# Patient Record
Sex: MALE | Race: WHITE | HISPANIC OR LATINO | URBAN - METROPOLITAN AREA
[De-identification: names, ages, dates, MRNs, and addresses within clinical notes are randomized per-mention and may not be internally consistent; named-entity substitution may affect disease eponyms.]

---

## 2020-01-01 ENCOUNTER — INPATIENT (INPATIENT)
Facility: HOSPITAL | Age: 60
LOS: 0 days | DRG: 208 | End: 2020-04-01
Attending: INTERNAL MEDICINE | Admitting: INTERNAL MEDICINE
Payer: COMMERCIAL

## 2020-01-01 VITALS
DIASTOLIC BLOOD PRESSURE: 87 MMHG | RESPIRATION RATE: 20 BRPM | HEART RATE: 91 BPM | WEIGHT: 184.97 LBS | TEMPERATURE: 100 F | OXYGEN SATURATION: 95 % | SYSTOLIC BLOOD PRESSURE: 140 MMHG | HEIGHT: 65 IN

## 2020-01-01 VITALS — OXYGEN SATURATION: 94 % | HEART RATE: 134 BPM

## 2020-01-01 DIAGNOSIS — R06.02 SHORTNESS OF BREATH: ICD-10-CM

## 2020-01-01 LAB
4/8 RATIO: 4.03 RATIO — HIGH (ref 0.9–3.6)
ABS CD8: 59 /UL — LOW (ref 142–740)
ALBUMIN SERPL ELPH-MCNC: 1.8 G/DL — LOW (ref 3.5–5)
ALBUMIN SERPL ELPH-MCNC: 1.8 G/DL — LOW (ref 3.5–5)
ALBUMIN SERPL ELPH-MCNC: 2.4 G/DL — LOW (ref 3.5–5)
ALP SERPL-CCNC: 179 U/L — HIGH (ref 40–120)
ALP SERPL-CCNC: 182 U/L — HIGH (ref 40–120)
ALP SERPL-CCNC: 185 U/L — HIGH (ref 40–120)
ALT FLD-CCNC: 61 U/L DA — HIGH (ref 10–60)
ALT FLD-CCNC: 86 U/L DA — HIGH (ref 10–60)
ALT FLD-CCNC: 95 U/L DA — HIGH (ref 10–60)
ANION GAP SERPL CALC-SCNC: 12 MMOL/L — SIGNIFICANT CHANGE UP (ref 5–17)
ANION GAP SERPL CALC-SCNC: 7 MMOL/L — SIGNIFICANT CHANGE UP (ref 5–17)
ANION GAP SERPL CALC-SCNC: 9 MMOL/L — SIGNIFICANT CHANGE UP (ref 5–17)
AST SERPL-CCNC: 181 U/L — HIGH (ref 10–40)
AST SERPL-CCNC: 203 U/L — HIGH (ref 10–40)
AST SERPL-CCNC: 62 U/L — HIGH (ref 10–40)
BASE EXCESS BLDA CALC-SCNC: -10.7 MMOL/L — LOW (ref -2–2)
BASE EXCESS BLDA CALC-SCNC: -11.9 MMOL/L — LOW (ref -2–2)
BASE EXCESS BLDA CALC-SCNC: -6.2 MMOL/L — LOW (ref -2–2)
BASE EXCESS BLDA CALC-SCNC: SIGNIFICANT CHANGE UP MMOL/L (ref -2–2)
BASOPHILS # BLD AUTO: 0.01 K/UL — SIGNIFICANT CHANGE UP (ref 0–0.2)
BASOPHILS NFR BLD AUTO: 0.1 % — SIGNIFICANT CHANGE UP (ref 0–2)
BILIRUB SERPL-MCNC: 0.6 MG/DL — SIGNIFICANT CHANGE UP (ref 0.2–1.2)
BILIRUB SERPL-MCNC: 1.3 MG/DL — HIGH (ref 0.2–1.2)
BILIRUB SERPL-MCNC: 1.4 MG/DL — HIGH (ref 0.2–1.2)
BLOOD GAS COMMENTS ARTERIAL: SIGNIFICANT CHANGE UP
BUN SERPL-MCNC: 10 MG/DL — SIGNIFICANT CHANGE UP (ref 7–18)
BUN SERPL-MCNC: 21 MG/DL — HIGH (ref 7–18)
BUN SERPL-MCNC: 24 MG/DL — HIGH (ref 7–18)
CALCIUM SERPL-MCNC: 6.9 MG/DL — LOW (ref 8.4–10.5)
CALCIUM SERPL-MCNC: 7 MG/DL — LOW (ref 8.4–10.5)
CALCIUM SERPL-MCNC: 7.9 MG/DL — LOW (ref 8.4–10.5)
CD3 BLASTS SPEC-ACNC: 330 /UL — LOW (ref 672–1870)
CD3 BLASTS SPEC-ACNC: 65 % — SIGNIFICANT CHANGE UP (ref 59–83)
CD4 %: 46 % — SIGNIFICANT CHANGE UP (ref 30–62)
CD8 %: 12 % — SIGNIFICANT CHANGE UP (ref 12–36)
CHLORIDE SERPL-SCNC: 100 MMOL/L — SIGNIFICANT CHANGE UP (ref 96–108)
CHLORIDE SERPL-SCNC: 102 MMOL/L — SIGNIFICANT CHANGE UP (ref 96–108)
CHLORIDE SERPL-SCNC: 103 MMOL/L — SIGNIFICANT CHANGE UP (ref 96–108)
CK MB BLD-MCNC: 0.9 % — SIGNIFICANT CHANGE UP (ref 0–3.5)
CK MB CFR SERPL CALC: 25.8 NG/ML — HIGH (ref 0–3.6)
CK SERPL-CCNC: 2859 U/L — HIGH (ref 35–232)
CK SERPL-CCNC: 809 U/L — HIGH (ref 35–232)
CO2 SERPL-SCNC: 26 MMOL/L — SIGNIFICANT CHANGE UP (ref 22–31)
CO2 SERPL-SCNC: 27 MMOL/L — SIGNIFICANT CHANGE UP (ref 22–31)
CO2 SERPL-SCNC: 31 MMOL/L — SIGNIFICANT CHANGE UP (ref 22–31)
CREAT SERPL-MCNC: 1.04 MG/DL — SIGNIFICANT CHANGE UP (ref 0.5–1.3)
CREAT SERPL-MCNC: 4.54 MG/DL — HIGH (ref 0.5–1.3)
CREAT SERPL-MCNC: 5.42 MG/DL — HIGH (ref 0.5–1.3)
CRP SERPL-MCNC: 33.63 MG/DL — HIGH (ref 0–0.4)
D DIMER BLD IA.RAPID-MCNC: 6975 NG/ML DDU — HIGH
EOSINOPHIL # BLD AUTO: 0.01 K/UL — SIGNIFICANT CHANGE UP (ref 0–0.5)
EOSINOPHIL NFR BLD AUTO: 0.1 % — SIGNIFICANT CHANGE UP (ref 0–6)
FERRITIN SERPL-MCNC: 5311 NG/ML — HIGH (ref 30–400)
FLU A RESULT: SIGNIFICANT CHANGE UP
FLU A RESULT: SIGNIFICANT CHANGE UP
FLUAV AG NPH QL: SIGNIFICANT CHANGE UP
FLUBV AG NPH QL: SIGNIFICANT CHANGE UP
GLUCOSE BLDC GLUCOMTR-MCNC: 136 MG/DL — HIGH (ref 70–99)
GLUCOSE BLDC GLUCOMTR-MCNC: 149 MG/DL — HIGH (ref 70–99)
GLUCOSE BLDC GLUCOMTR-MCNC: 179 MG/DL — HIGH (ref 70–99)
GLUCOSE BLDC GLUCOMTR-MCNC: 179 MG/DL — HIGH (ref 70–99)
GLUCOSE BLDC GLUCOMTR-MCNC: 94 MG/DL — SIGNIFICANT CHANGE UP (ref 70–99)
GLUCOSE BLDC GLUCOMTR-MCNC: 98 MG/DL — SIGNIFICANT CHANGE UP (ref 70–99)
GLUCOSE SERPL-MCNC: 129 MG/DL — HIGH (ref 70–99)
GLUCOSE SERPL-MCNC: 158 MG/DL — HIGH (ref 70–99)
GLUCOSE SERPL-MCNC: 166 MG/DL — HIGH (ref 70–99)
HCO3 BLDA-SCNC: 23 MMOL/L — SIGNIFICANT CHANGE UP (ref 23–27)
HCO3 BLDA-SCNC: 27 MMOL/L — SIGNIFICANT CHANGE UP (ref 23–27)
HCO3 BLDA-SCNC: 27 MMOL/L — SIGNIFICANT CHANGE UP (ref 23–27)
HCO3 BLDA-SCNC: 28 MMOL/L — HIGH (ref 23–27)
HCT VFR BLD CALC: 35.7 % — LOW (ref 39–50)
HCT VFR BLD CALC: 42.1 % — SIGNIFICANT CHANGE UP (ref 39–50)
HGB BLD-MCNC: 12.3 G/DL — LOW (ref 13–17)
HGB BLD-MCNC: 13 G/DL — SIGNIFICANT CHANGE UP (ref 13–17)
HOROWITZ INDEX BLDA+IHG-RTO: 100 — SIGNIFICANT CHANGE UP
HOROWITZ INDEX BLDA+IHG-RTO: 100 — SIGNIFICANT CHANGE UP
HOROWITZ INDEX BLDA+IHG-RTO: 90 — SIGNIFICANT CHANGE UP
HOROWITZ INDEX BLDA+IHG-RTO: 90 — SIGNIFICANT CHANGE UP
IMM GRANULOCYTES NFR BLD AUTO: 0.9 % — SIGNIFICANT CHANGE UP (ref 0–1.5)
INR BLD: 1.04 RATIO — SIGNIFICANT CHANGE UP (ref 0.88–1.16)
LACTATE SERPL-SCNC: 3.1 MMOL/L — HIGH (ref 0.7–2)
LDH SERPL L TO P-CCNC: 911 U/L — HIGH (ref 120–225)
LYMPHOCYTES # BLD AUTO: 0.52 K/UL — LOW (ref 1–3.3)
LYMPHOCYTES # BLD AUTO: 5.3 % — LOW (ref 13–44)
MAGNESIUM SERPL-MCNC: 2.3 MG/DL — SIGNIFICANT CHANGE UP (ref 1.6–2.6)
MAGNESIUM SERPL-MCNC: 2.3 MG/DL — SIGNIFICANT CHANGE UP (ref 1.6–2.6)
MCHC RBC-ENTMCNC: 29.5 PG — SIGNIFICANT CHANGE UP (ref 27–34)
MCHC RBC-ENTMCNC: 29.6 PG — SIGNIFICANT CHANGE UP (ref 27–34)
MCHC RBC-ENTMCNC: 30.9 GM/DL — LOW (ref 32–36)
MCHC RBC-ENTMCNC: 34.5 GM/DL — SIGNIFICANT CHANGE UP (ref 32–36)
MCV RBC AUTO: 86 FL — SIGNIFICANT CHANGE UP (ref 80–100)
MCV RBC AUTO: 95.7 FL — SIGNIFICANT CHANGE UP (ref 80–100)
MONOCYTES # BLD AUTO: 0.35 K/UL — SIGNIFICANT CHANGE UP (ref 0–0.9)
MONOCYTES NFR BLD AUTO: 3.6 % — SIGNIFICANT CHANGE UP (ref 2–14)
NEUTROPHILS # BLD AUTO: 8.77 K/UL — HIGH (ref 1.8–7.4)
NEUTROPHILS NFR BLD AUTO: 90 % — HIGH (ref 43–77)
NRBC # BLD: 0 /100 WBCS — SIGNIFICANT CHANGE UP (ref 0–0)
NRBC # BLD: 0 /100 WBCS — SIGNIFICANT CHANGE UP (ref 0–0)
PCO2 BLDA: 116 MMHG — CRITICAL HIGH (ref 32–46)
PCO2 BLDA: 138 MMHG — CRITICAL HIGH (ref 32–46)
PCO2 BLDA: 173 MMHG — CRITICAL HIGH (ref 32–46)
PCO2 BLDA: 95 MMHG — CRITICAL HIGH (ref 32–46)
PH BLDA: 6.82 — CRITICAL LOW (ref 7.35–7.45)
PH BLDA: 6.92 — CRITICAL LOW (ref 7.35–7.45)
PH BLDA: 7.01 — CRITICAL LOW (ref 7.35–7.45)
PH BLDA: 7.02 — CRITICAL LOW (ref 7.35–7.45)
PHOSPHATE SERPL-MCNC: 8.1 MG/DL — HIGH (ref 2.5–4.5)
PHOSPHATE SERPL-MCNC: 9.3 MG/DL — HIGH (ref 2.5–4.5)
PLATELET # BLD AUTO: 212 K/UL — SIGNIFICANT CHANGE UP (ref 150–400)
PLATELET # BLD AUTO: 263 K/UL — SIGNIFICANT CHANGE UP (ref 150–400)
PO2 BLDA: 117 MMHG — HIGH (ref 74–108)
PO2 BLDA: 71 MMHG — LOW (ref 74–108)
PO2 BLDA: 77 MMHG — SIGNIFICANT CHANGE UP (ref 74–108)
PO2 BLDA: 90 MMHG — SIGNIFICANT CHANGE UP (ref 74–108)
POTASSIUM SERPL-MCNC: 3.3 MMOL/L — LOW (ref 3.5–5.3)
POTASSIUM SERPL-MCNC: 4.2 MMOL/L — SIGNIFICANT CHANGE UP (ref 3.5–5.3)
POTASSIUM SERPL-MCNC: 4.7 MMOL/L — SIGNIFICANT CHANGE UP (ref 3.5–5.3)
POTASSIUM SERPL-SCNC: 3.3 MMOL/L — LOW (ref 3.5–5.3)
POTASSIUM SERPL-SCNC: 4.2 MMOL/L — SIGNIFICANT CHANGE UP (ref 3.5–5.3)
POTASSIUM SERPL-SCNC: 4.7 MMOL/L — SIGNIFICANT CHANGE UP (ref 3.5–5.3)
PROCALCITONIN SERPL-MCNC: 196.8 NG/ML — HIGH (ref 0.02–0.1)
PROT SERPL-MCNC: 6 G/DL — SIGNIFICANT CHANGE UP (ref 6–8.3)
PROT SERPL-MCNC: 6.3 G/DL — SIGNIFICANT CHANGE UP (ref 6–8.3)
PROT SERPL-MCNC: 7.3 G/DL — SIGNIFICANT CHANGE UP (ref 6–8.3)
PROTHROM AB SERPL-ACNC: 11.8 SEC — SIGNIFICANT CHANGE UP (ref 10–12.9)
RAPID RVP RESULT: SIGNIFICANT CHANGE UP
RBC # BLD: 4.15 M/UL — LOW (ref 4.2–5.8)
RBC # BLD: 4.4 M/UL — SIGNIFICANT CHANGE UP (ref 4.2–5.8)
RBC # FLD: 13.2 % — SIGNIFICANT CHANGE UP (ref 10.3–14.5)
RBC # FLD: 14 % — SIGNIFICANT CHANGE UP (ref 10.3–14.5)
RSV RESULT: SIGNIFICANT CHANGE UP
RSV RNA RESP QL NAA+PROBE: SIGNIFICANT CHANGE UP
SAO2 % BLDA: 84 % — LOW (ref 92–96)
SAO2 % BLDA: 92 % — SIGNIFICANT CHANGE UP (ref 92–96)
SAO2 % BLDA: 95 % — SIGNIFICANT CHANGE UP (ref 92–96)
SAO2 % BLDA: SIGNIFICANT CHANGE UP % (ref 92–96)
SARS-COV-2 RNA SPEC QL NAA+PROBE: DETECTED
SODIUM SERPL-SCNC: 137 MMOL/L — SIGNIFICANT CHANGE UP (ref 135–145)
SODIUM SERPL-SCNC: 140 MMOL/L — SIGNIFICANT CHANGE UP (ref 135–145)
SODIUM SERPL-SCNC: 140 MMOL/L — SIGNIFICANT CHANGE UP (ref 135–145)
T-CELL CD4 SUBSET PNL BLD: 237 /UL — LOW (ref 489–1457)
TROPONIN I SERPL-MCNC: 4.84 NG/ML — HIGH (ref 0–0.04)
TROPONIN I SERPL-MCNC: 5.93 NG/ML — HIGH (ref 0–0.04)
WBC # BLD: 34.96 K/UL — HIGH (ref 3.8–10.5)
WBC # BLD: 9.75 K/UL — SIGNIFICANT CHANGE UP (ref 3.8–10.5)
WBC # FLD AUTO: 34.96 K/UL — HIGH (ref 3.8–10.5)
WBC # FLD AUTO: 9.75 K/UL — SIGNIFICANT CHANGE UP (ref 3.8–10.5)

## 2020-01-01 PROCEDURE — 31500 INSERT EMERGENCY AIRWAY: CPT

## 2020-01-01 PROCEDURE — 99292 CRITICAL CARE ADDL 30 MIN: CPT

## 2020-01-01 PROCEDURE — 71045 X-RAY EXAM CHEST 1 VIEW: CPT | Mod: 26

## 2020-01-01 PROCEDURE — 71045 X-RAY EXAM CHEST 1 VIEW: CPT | Mod: 26,77

## 2020-01-01 PROCEDURE — 99291 CRITICAL CARE FIRST HOUR: CPT

## 2020-01-01 PROCEDURE — 99285 EMERGENCY DEPT VISIT HI MDM: CPT | Mod: 25

## 2020-01-01 RX ORDER — HYDROMORPHONE HYDROCHLORIDE 2 MG/ML
1 INJECTION INTRAMUSCULAR; INTRAVENOUS; SUBCUTANEOUS
Qty: 100 | Refills: 0 | Status: DISCONTINUED | OUTPATIENT
Start: 2020-01-01 | End: 2020-01-01

## 2020-01-01 RX ORDER — PROPOFOL 10 MG/ML
5 INJECTION, EMULSION INTRAVENOUS
Qty: 1000 | Refills: 0 | Status: DISCONTINUED | OUTPATIENT
Start: 2020-01-01 | End: 2020-01-01

## 2020-01-01 RX ORDER — ETOMIDATE 2 MG/ML
20 INJECTION INTRAVENOUS ONCE
Refills: 0 | Status: DISCONTINUED | OUTPATIENT
Start: 2020-01-01 | End: 2020-01-01

## 2020-01-01 RX ORDER — HYDROXYCHLOROQUINE SULFATE 200 MG
TABLET ORAL
Refills: 0 | Status: DISCONTINUED | OUTPATIENT
Start: 2020-01-01 | End: 2020-01-01

## 2020-01-01 RX ORDER — CEFEPIME 1 G/1
500 INJECTION, POWDER, FOR SOLUTION INTRAMUSCULAR; INTRAVENOUS DAILY
Refills: 0 | Status: DISCONTINUED | OUTPATIENT
Start: 2020-01-01 | End: 2020-01-01

## 2020-01-01 RX ORDER — CHLORHEXIDINE GLUCONATE 213 G/1000ML
15 SOLUTION TOPICAL EVERY 12 HOURS
Refills: 0 | Status: DISCONTINUED | OUTPATIENT
Start: 2020-01-01 | End: 2020-01-01

## 2020-01-01 RX ORDER — CISATRACURIUM BESYLATE 2 MG/ML
10 INJECTION INTRAVENOUS ONCE
Refills: 0 | Status: DISCONTINUED | OUTPATIENT
Start: 2020-01-01 | End: 2020-01-01

## 2020-01-01 RX ORDER — FUROSEMIDE 40 MG
100 TABLET ORAL ONCE
Refills: 0 | Status: COMPLETED | OUTPATIENT
Start: 2020-01-01 | End: 2020-01-01

## 2020-01-01 RX ORDER — FENTANYL CITRATE 50 UG/ML
0.5 INJECTION INTRAVENOUS
Qty: 2500 | Refills: 0 | Status: DISCONTINUED | OUTPATIENT
Start: 2020-01-01 | End: 2020-01-01

## 2020-01-01 RX ORDER — HYDROXYCHLOROQUINE SULFATE 200 MG
200 TABLET ORAL EVERY 12 HOURS
Refills: 0 | Status: DISCONTINUED | OUTPATIENT
Start: 2020-01-01 | End: 2020-01-01

## 2020-01-01 RX ORDER — CEFEPIME 1 G/1
1000 INJECTION, POWDER, FOR SOLUTION INTRAMUSCULAR; INTRAVENOUS ONCE
Refills: 0 | Status: COMPLETED | OUTPATIENT
Start: 2020-01-01 | End: 2020-01-01

## 2020-01-01 RX ORDER — THIAMINE MONONITRATE (VIT B1) 100 MG
200 TABLET ORAL EVERY 24 HOURS
Refills: 0 | Status: DISCONTINUED | OUTPATIENT
Start: 2020-01-01 | End: 2020-01-01

## 2020-01-01 RX ORDER — HYDROMORPHONE HYDROCHLORIDE 2 MG/ML
1 INJECTION INTRAMUSCULAR; INTRAVENOUS; SUBCUTANEOUS ONCE
Refills: 0 | Status: DISCONTINUED | OUTPATIENT
Start: 2020-01-01 | End: 2020-01-01

## 2020-01-01 RX ORDER — ENOXAPARIN SODIUM 100 MG/ML
40 INJECTION SUBCUTANEOUS DAILY
Refills: 0 | Status: DISCONTINUED | OUTPATIENT
Start: 2020-01-01 | End: 2020-01-01

## 2020-01-01 RX ORDER — POTASSIUM CHLORIDE 20 MEQ
10 PACKET (EA) ORAL ONCE
Refills: 0 | Status: DISCONTINUED | OUTPATIENT
Start: 2020-01-01 | End: 2020-01-01

## 2020-01-01 RX ORDER — HEPARIN SODIUM 5000 [USP'U]/ML
5000 INJECTION INTRAVENOUS; SUBCUTANEOUS EVERY 8 HOURS
Refills: 0 | Status: DISCONTINUED | OUTPATIENT
Start: 2020-01-01 | End: 2020-01-01

## 2020-01-01 RX ORDER — SUCCINYLCHOLINE CHLORIDE 100 MG/5ML
100 SYRINGE (ML) INTRAVENOUS ONCE
Refills: 0 | Status: DISCONTINUED | OUTPATIENT
Start: 2020-01-01 | End: 2020-01-01

## 2020-01-01 RX ORDER — NOREPINEPHRINE BITARTRATE/D5W 8 MG/250ML
0.05 PLASTIC BAG, INJECTION (ML) INTRAVENOUS
Qty: 16 | Refills: 0 | Status: DISCONTINUED | OUTPATIENT
Start: 2020-01-01 | End: 2020-01-01

## 2020-01-01 RX ORDER — MIDAZOLAM HYDROCHLORIDE 1 MG/ML
4 INJECTION, SOLUTION INTRAMUSCULAR; INTRAVENOUS ONCE
Refills: 0 | Status: COMPLETED | OUTPATIENT
Start: 2020-01-01 | End: 2020-01-01

## 2020-01-01 RX ORDER — VANCOMYCIN HCL 1 G
1250 VIAL (EA) INTRAVENOUS ONCE
Refills: 0 | Status: DISCONTINUED | OUTPATIENT
Start: 2020-01-01 | End: 2020-01-01

## 2020-01-01 RX ORDER — PHENYLEPHRINE HYDROCHLORIDE 10 MG/ML
0.5 INJECTION INTRAVENOUS
Qty: 40 | Refills: 0 | Status: DISCONTINUED | OUTPATIENT
Start: 2020-01-01 | End: 2020-01-01

## 2020-01-01 RX ORDER — MIDAZOLAM HYDROCHLORIDE 1 MG/ML
0.02 INJECTION, SOLUTION INTRAMUSCULAR; INTRAVENOUS
Qty: 100 | Refills: 0 | Status: DISCONTINUED | OUTPATIENT
Start: 2020-01-01 | End: 2020-01-01

## 2020-01-01 RX ORDER — ACETAMINOPHEN 500 MG
650 TABLET ORAL EVERY 6 HOURS
Refills: 0 | Status: DISCONTINUED | OUTPATIENT
Start: 2020-01-01 | End: 2020-01-01

## 2020-01-01 RX ORDER — CISATRACURIUM BESYLATE 2 MG/ML
3 INJECTION INTRAVENOUS
Qty: 200 | Refills: 0 | Status: DISCONTINUED | OUTPATIENT
Start: 2020-01-01 | End: 2020-01-01

## 2020-01-01 RX ORDER — SODIUM BICARBONATE 1 MEQ/ML
100 SYRINGE (ML) INTRAVENOUS ONCE
Refills: 0 | Status: COMPLETED | OUTPATIENT
Start: 2020-01-01 | End: 2020-01-01

## 2020-01-01 RX ORDER — POTASSIUM CHLORIDE 20 MEQ
10 PACKET (EA) ORAL ONCE
Refills: 0 | Status: COMPLETED | OUTPATIENT
Start: 2020-01-01 | End: 2020-01-01

## 2020-01-01 RX ORDER — SODIUM CHLORIDE 9 MG/ML
1000 INJECTION, SOLUTION INTRAVENOUS
Refills: 0 | Status: DISCONTINUED | OUTPATIENT
Start: 2020-01-01 | End: 2020-01-01

## 2020-01-01 RX ORDER — ASCORBIC ACID 60 MG
1500 TABLET,CHEWABLE ORAL
Refills: 0 | Status: DISCONTINUED | OUTPATIENT
Start: 2020-01-01 | End: 2020-01-01

## 2020-01-01 RX ORDER — HYDROXYCHLOROQUINE SULFATE 200 MG
400 TABLET ORAL EVERY 12 HOURS
Refills: 0 | Status: COMPLETED | OUTPATIENT
Start: 2020-01-01 | End: 2020-01-01

## 2020-01-01 RX ORDER — PANTOPRAZOLE SODIUM 20 MG/1
40 TABLET, DELAYED RELEASE ORAL
Refills: 0 | Status: DISCONTINUED | OUTPATIENT
Start: 2020-01-01 | End: 2020-01-01

## 2020-01-01 RX ORDER — VASOPRESSIN 20 [USP'U]/ML
0.04 INJECTION INTRAVENOUS
Qty: 50 | Refills: 0 | Status: DISCONTINUED | OUTPATIENT
Start: 2020-01-01 | End: 2020-01-01

## 2020-01-01 RX ORDER — PHENYLEPHRINE HYDROCHLORIDE 10 MG/ML
0.5 INJECTION INTRAVENOUS
Qty: 160 | Refills: 0 | Status: DISCONTINUED | OUTPATIENT
Start: 2020-01-01 | End: 2020-01-01

## 2020-01-01 RX ORDER — MIDAZOLAM HYDROCHLORIDE 1 MG/ML
2 INJECTION, SOLUTION INTRAMUSCULAR; INTRAVENOUS ONCE
Refills: 0 | Status: DISCONTINUED | OUTPATIENT
Start: 2020-01-01 | End: 2020-01-01

## 2020-01-01 RX ORDER — AZITHROMYCIN 500 MG/1
250 TABLET, FILM COATED ORAL DAILY
Refills: 0 | Status: DISCONTINUED | OUTPATIENT
Start: 2020-01-01 | End: 2020-01-01

## 2020-01-01 RX ORDER — ACETAMINOPHEN 500 MG
975 TABLET ORAL ONCE
Refills: 0 | Status: COMPLETED | OUTPATIENT
Start: 2020-01-01 | End: 2020-01-01

## 2020-01-01 RX ORDER — AZITHROMYCIN 500 MG/1
500 TABLET, FILM COATED ORAL DAILY
Refills: 0 | Status: DISCONTINUED | OUTPATIENT
Start: 2020-01-01 | End: 2020-01-01

## 2020-01-01 RX ORDER — SODIUM CHLORIDE 9 MG/ML
1900 INJECTION INTRAMUSCULAR; INTRAVENOUS; SUBCUTANEOUS ONCE
Refills: 0 | Status: COMPLETED | OUTPATIENT
Start: 2020-01-01 | End: 2020-01-01

## 2020-01-01 RX ADMIN — PHENYLEPHRINE HYDROCHLORIDE 7.87 MICROGRAM(S)/KG/MIN: 10 INJECTION INTRAVENOUS at 12:05

## 2020-01-01 RX ADMIN — ENOXAPARIN SODIUM 40 MILLIGRAM(S): 100 INJECTION SUBCUTANEOUS at 14:13

## 2020-01-01 RX ADMIN — PROPOFOL 2.52 MICROGRAM(S)/KG/MIN: 10 INJECTION, EMULSION INTRAVENOUS at 04:58

## 2020-01-01 RX ADMIN — CHLORHEXIDINE GLUCONATE 15 MILLILITER(S): 213 SOLUTION TOPICAL at 04:57

## 2020-01-01 RX ADMIN — AZITHROMYCIN 500 MILLIGRAM(S): 500 TABLET, FILM COATED ORAL at 14:13

## 2020-01-01 RX ADMIN — Medication 1500 MILLIGRAM(S): at 12:55

## 2020-01-01 RX ADMIN — Medication 1500 MILLIGRAM(S): at 16:37

## 2020-01-01 RX ADMIN — SODIUM CHLORIDE 150 MILLILITER(S): 9 INJECTION, SOLUTION INTRAVENOUS at 22:05

## 2020-01-01 RX ADMIN — CHLORHEXIDINE GLUCONATE 15 MILLILITER(S): 213 SOLUTION TOPICAL at 17:12

## 2020-01-01 RX ADMIN — CHLORHEXIDINE GLUCONATE 15 MILLILITER(S): 213 SOLUTION TOPICAL at 17:42

## 2020-01-01 RX ADMIN — CEFEPIME 100 MILLIGRAM(S): 1 INJECTION, POWDER, FOR SOLUTION INTRAMUSCULAR; INTRAVENOUS at 06:05

## 2020-01-01 RX ADMIN — Medication 400 MILLIGRAM(S): at 21:14

## 2020-01-01 RX ADMIN — Medication 400 MILLIGRAM(S): at 08:42

## 2020-01-01 RX ADMIN — HEPARIN SODIUM 5000 UNIT(S): 5000 INJECTION INTRAVENOUS; SUBCUTANEOUS at 13:14

## 2020-01-01 RX ADMIN — Medication 4.45 MICROGRAM(S)/KG/MIN: at 02:14

## 2020-01-01 RX ADMIN — Medication 100 MILLIGRAM(S): at 13:13

## 2020-01-01 RX ADMIN — SODIUM CHLORIDE 150 MILLILITER(S): 9 INJECTION, SOLUTION INTRAVENOUS at 06:09

## 2020-01-01 RX ADMIN — VASOPRESSIN 2.4 UNIT(S)/MIN: 20 INJECTION INTRAVENOUS at 22:44

## 2020-01-01 RX ADMIN — Medication 4.45 MICROGRAM(S)/KG/MIN: at 22:43

## 2020-01-01 RX ADMIN — Medication 975 MILLIGRAM(S): at 06:05

## 2020-01-01 RX ADMIN — AZITHROMYCIN 250 MILLIGRAM(S): 500 TABLET, FILM COATED ORAL at 13:13

## 2020-01-01 RX ADMIN — PROPOFOL 2.52 MICROGRAM(S)/KG/MIN: 10 INJECTION, EMULSION INTRAVENOUS at 17:10

## 2020-01-01 RX ADMIN — CISATRACURIUM BESYLATE 15.1 MICROGRAM(S)/KG/MIN: 2 INJECTION INTRAVENOUS at 17:11

## 2020-01-01 RX ADMIN — Medication 100 MILLIEQUIVALENT(S): at 18:40

## 2020-01-01 RX ADMIN — Medication 100 MILLIEQUIVALENT(S): at 14:13

## 2020-01-01 RX ADMIN — PHENYLEPHRINE HYDROCHLORIDE 7.87 MICROGRAM(S)/KG/MIN: 10 INJECTION INTRAVENOUS at 12:00

## 2020-01-01 RX ADMIN — CISATRACURIUM BESYLATE 15.1 MICROGRAM(S)/KG/MIN: 2 INJECTION INTRAVENOUS at 16:38

## 2020-01-01 RX ADMIN — PHENYLEPHRINE HYDROCHLORIDE 7.87 MICROGRAM(S)/KG/MIN: 10 INJECTION INTRAVENOUS at 17:11

## 2020-01-01 RX ADMIN — HYDROMORPHONE HYDROCHLORIDE 1 MG/HR: 2 INJECTION INTRAMUSCULAR; INTRAVENOUS; SUBCUTANEOUS at 04:58

## 2020-01-01 RX ADMIN — CISATRACURIUM BESYLATE 15.1 MICROGRAM(S)/KG/MIN: 2 INJECTION INTRAVENOUS at 16:30

## 2020-01-01 RX ADMIN — Medication 200 MILLIGRAM(S): at 16:36

## 2020-01-01 RX ADMIN — FENTANYL CITRATE 4.2 MICROGRAM(S)/KG/HR: 50 INJECTION INTRAVENOUS at 17:11

## 2020-01-01 RX ADMIN — FENTANYL CITRATE 4.2 MICROGRAM(S)/KG/HR: 50 INJECTION INTRAVENOUS at 08:35

## 2020-01-01 RX ADMIN — PANTOPRAZOLE SODIUM 40 MILLIGRAM(S): 20 TABLET, DELAYED RELEASE ORAL at 04:58

## 2020-01-01 RX ADMIN — SODIUM CHLORIDE 1900 MILLILITER(S): 9 INJECTION INTRAMUSCULAR; INTRAVENOUS; SUBCUTANEOUS at 06:05

## 2020-03-31 NOTE — ED ADULT TRIAGE NOTE - CHIEF COMPLAINT QUOTE
Pt BIBA for compliant of chest pain, difficulty breathing and fever. Pt was seen by PMD 7 days ago for same compliant  was prescribed Zithromax, but he is feeling worse. Pt saturation 85-87 on room air as per EMS.

## 2020-03-31 NOTE — ED ADULT NURSE NOTE - NSIMPLEMENTINTERV_GEN_ALL_ED
Implemented All Universal Safety Interventions:  [place] to call system. Call bell, personal items and telephone within reach. Instruct patient to call for assistance. Room bathroom lighting operational. Non-slip footwear when patient is off stretcher. Physically safe environment: no spills, clutter or unnecessary equipment. Stretcher in lowest position, wheels locked, appropriate side rails in place.

## 2020-03-31 NOTE — PROGRESS NOTE ADULT - SUBJECTIVE AND OBJECTIVE BOX
CHIEF COMPLAINT: COVID-19    Interval Events: arrived to the MICU intubated, saturation 50, overbreathing the vent  he was sedated with propofol and fentanyl  rocuronium  his PEEP was at 20    REVIEW OF SYSTEMS:  Constitutional: [ ] negative [ ] fevers [ ] chills [ ] weight loss [ ] weight gain  HEENT: [ ] negative [ ] dry eyes [ ] eye irritation [ ] postnasal drip [ ] nasal congestion  CV: [ ] negative  [ ] chest pain [ ] orthopnea [ ] palpitations [ ] murmur  Resp: [ ] negative [ ] cough [ ] shortness of breath [ ] dyspnea [ ] wheezing [ ] sputum [ ] hemoptysis  GI: [ ] negative [ ] nausea [ ] vomiting [ ] diarrhea [ ] constipation [ ] abd pain [ ] dysphagia   : [ ] negative [ ] dysuria [ ] nocturia [ ] hematuria [ ] increased urinary frequency  Musculoskeletal: [ ] negative [ ] back pain [ ] myalgias [ ] arthralgias [ ] fracture  Skin: [ ] negative [ ] rash [ ] itch  Neurological: [ ] negative [ ] headache [ ] dizziness [ ] syncope [ ] weakness [ ] numbness  Psychiatric: [ ] negative [ ] anxiety [ ] depression  Endocrine: [ ] negative [ ] diabetes [ ] thyroid problem  Hematologic/Lymphatic: [ ] negative [ ] anemia [ ] bleeding problem  Allergic/Immunologic: [ ] negative [ ] itchy eyes [ ] nasal discharge [ ] hives [ ] angioedema  [ ] All other systems negative  [x ] Unable to assess ROS because ________sedated     OBJECTIVE:  ICU Vital Signs Last 24 Hrs  T(C): 37.6 (31 Mar 2020 04:15), Max: 37.6 (31 Mar 2020 04:15)  T(F): 99.6 (31 Mar 2020 04:15), Max: 99.6 (31 Mar 2020 04:15)  HR: 110 (31 Mar 2020 10:46) (91 - 110)  BP: 117/72 (31 Mar 2020 10:46) (117/72 - 140/87)  BP(mean): --  ABP: --  ABP(mean): --  RR: 14 (31 Mar 2020 10:46) (14 - 20)  SpO2: 90% (31 Mar 2020 10:46) (90% - 95%)    Mode: AC/ CMV (Assist Control/ Continuous Mandatory Ventilation), RR (machine): 20, TV (machine): 450, FiO2: 100, PEEP: 20, ITime: 1, MAP: 20, PIP: 30    CAPILLARY BLOOD GLUCOSE          PHYSICAL EXAM:  General: NAD  HEENT: atraumatic, normocephalic;  Neck: supple, no LAD, no thyromegaly  Respiratory: decreased air entry bilaterally   Cardiovascular: RRR, S1, S2, no M/R/G  Abdomen: normal BS; soft, non-distended, non-tender; no R/G  Extremities: radial and DP pulses intact b/l; no clubbing, cyanosis;    LINES:    HOSPITAL MEDICATIONS:  Standing Meds:  azithromycin   Tablet 500 milliGRAM(s) Oral daily  chlorhexidine 0.12% Liquid 15 milliLiter(s) Oral Mucosa every 12 hours  cisatracurium Injectable 10 milliGRAM(s) IV Push once  enoxaparin Injectable 40 milliGRAM(s) SubCutaneous daily  etomidate Injectable 20 milliGRAM(s) IV Push once  fentaNYL   Infusion 0.5 MICROgram(s)/kG/Hr IV Continuous <Continuous>  HYDROmorphone  Injectable 1 milliGRAM(s) IV Push once  midazolam Injectable 2 milliGRAM(s) IV Push once  phenylephrine    Infusion 0.5 MICROgram(s)/kG/Min IV Continuous <Continuous>  propofol Infusion 5 MICROgram(s)/kG/Min IV Continuous <Continuous>  succinylcholine Injectable 100 milliGRAM(s) IV Push once      PRN Meds:      LABS:                        12.3   9.75  )-----------( 212      ( 31 Mar 2020 06:39 )             35.7     Hgb Trend: 12.3<--  03-31    137  |  103  |  10  ----------------------------<  129<H>  3.3<L>   |  27  |  1.04    Ca    7.9<L>      31 Mar 2020 06:39    TPro  7.3  /  Alb  2.4<L>  /  TBili  0.6  /  DBili  x   /  AST  62<H>  /  ALT  61<H>  /  AlkPhos  182<H>  03-31    Creatinine Trend: 1.04<--      PF ratio:        CRP:    Q3D labs:  ESR:  T-cell subset  D-Dimer  LDH  Feritin  Troponin   CK      MICROBIOLOGY:       RADIOLOGY:  [ ] Reviewed and interpreted by me    POCUS:    EKG:acute hypoxic respiratory distress requiring intubation and sedation. Imaging c/w ARDS. Admitted to MICU for further observation and management.    Pulmonary:  #Sepsis 2/2 COVID+  - s/p azithromax in ED  - UA , f/u UCx  - f/u BCx    COVID pending   - daily CBC/CMP/Mg/Phos/CRP  - q3d labs: ESR, Tcell subset, d-dimer, LDH, ferritin, CK, trop, coags  - COVID isolation protocol      #Hypoxic respiratory failure 2/2 ARDS likely due to COVID  is on PEEP 20 and FIO2 100, stil lwill low sat  will seen ABG, chest xray worse  will continue rocuronium    - repeat ABG post-intubation   - continue to trend ABG with vent adjustments  - will prone pt in order to improve oxygenation and secretion drainage  - continue to closely monitor pt    Cardiovascular:  #Hypotension  - pt intubated and requiring pressor support in the setting of sedation  - levophed gtt, phenylephrine fentanyl gtt, propofol gtt  - goal MAP >65  femoral line   a line     Neurology:  #Sedation  - pt currently on fentanyl gtt and propofol gtt   - RASS goal -4    Gastrointestinal:  #Prophylaxis  - protonix 40mg IV daily while intubated  NPO for now     Genitourinary:  - continue to monitor strict I&O  - UA   - Cr     ID:  #Sepsis  likely due to COVID  - UA   - f/u BCx, UCx  - ID consulted, f/u recs    F:   E: Replete K<4, Mg<2  N: NPO   G: protonix 40mg IV daily    Code: FULL CODE    Dispo: Patient requires continued monitoring in MICU

## 2020-03-31 NOTE — ED ADULT NURSE NOTE - CAS TRG GEN SKIN CONDITION
[FreeTextEntry1] : EKG:  Sinus rhythm, No change from prior EKG.\par Hemodynamically stable.  No signs of CHF on exam.  BP well controlled\par 
Warm

## 2020-03-31 NOTE — H&P ADULT - HISTORY OF PRESENT ILLNESS
=================Interval HPI===================  - HPI: 61 y/o male no pmh presents with SOB. EMS reported oxygen 88-89% on RA at home. Pt went to Urgent Care a few days ago and has been taking PO abx. Also with fever and cough. Intubated overnight. During morning patient self extubated and had to be emergently reintubated by ED attending.   ================CHIEF COMPLAINT===============  Patient is a 60y old  Male who presents with a chief complaint of SOB      ============CURRENT MEDICATIONS===============    MEDICATIONS  (STANDING):  azithromycin   Tablet 500 milliGRAM(s) Oral daily  chlorhexidine 0.12% Liquid 15 milliLiter(s) Oral Mucosa every 12 hours  cisatracurium Injectable 10 milliGRAM(s) IV Push once  enoxaparin Injectable 40 milliGRAM(s) SubCutaneous daily  etomidate Injectable 20 milliGRAM(s) IV Push once  fentaNYL   Infusion 0.5 MICROgram(s)/kG/Hr (4.2 mL/Hr) IV Continuous <Continuous>  HYDROmorphone  Injectable 1 milliGRAM(s) IV Push once  midazolam Injectable 4 milliGRAM(s) IV Push once  midazolam Injectable 2 milliGRAM(s) IV Push once  phenylephrine    Infusion 0.5 MICROgram(s)/kG/Min (15.7 mL/Hr) IV Continuous <Continuous>  propofol Infusion 5 MICROgram(s)/kG/Min (2.52 mL/Hr) IV Continuous <Continuous>  succinylcholine Injectable 100 milliGRAM(s) IV Push once    MEDICATIONS  (PRN):        ============REVIEW OF SYSTEMS==================    Unable to obtain   ================VITALS SIGNS=====================  Vital Signs Last 24 Hrs  T(C): 37.6 (31 Mar 2020 04:15), Max: 37.6 (31 Mar 2020 04:15)  T(F): 99.6 (31 Mar 2020 04:15), Max: 99.6 (31 Mar 2020 04:15)  HR: 101 (31 Mar 2020 08:14) (91 - 101)  BP: 140/87 (31 Mar 2020 04:15) (140/87 - 140/87)  BP(mean): --  RR: 20 (31 Mar 2020 04:15) (20 - 20)  SpO2: 93% (31 Mar 2020 08:14) (93% - 95%)    ===============PHYSICAL EXAM====================    GENERAL: Sedated and Intubated  HEENT: Normocephalic;  conjunctivae and sclerae clear; moist mucous membranes;   NECK : supple  CHEST/LUNG: Bilateral crackles  HEART: S1 S2  regular; no murmurs, gallops or rubs  ABDOMEN: Soft, Nontender, Nondistended; Bowel sounds present  EXTREMITIES: no cyanosis; no edema; no calf tenderness  SKIN: warm and dry; no rash  NERVOUS SYSTEM:  Sedated    ==============LABORATORIES======================  LABS:                        12.3   9.75  )-----------( 212      ( 31 Mar 2020 06:39 )             35.7     03-31    137  |  103  |  10  ----------------------------<  129<H>  3.3<L>   |  27  |  1.04    Ca    7.9<L>      31 Mar 2020 06:39    TPro  7.3  /  Alb  2.4<L>  /  TBili  0.6  /  DBili  x   /  AST  62<H>  /  ALT  61<H>  /  AlkPhos  182<H>  03-31        CAPILLARY BLOOD GLUCOSE          =============INPUTS/OUPUTS=====================        RADIOLOGY & ADDITIONAL TESTS:    Imaging Personally Reviewed:  YES    Consultant(s) Notes Reviewed:   YES    Care Discussed with Consultants : YES

## 2020-03-31 NOTE — ED PROVIDER NOTE - OBJECTIVE STATEMENT
59 yo M no pmh presents with SOB. EMS reported oxygen 88-89% on RA at home. Pt went to  a few days ago and has been taking PO abx. Also with fever and cough.

## 2020-03-31 NOTE — ED PROVIDER NOTE - PROGRESS NOTE DETAILS
CXR - multifocal PNA  No PCP, so admitted to unattached Dr Simmons. Endorsed to MAR. As I walked by pt's bed he was in respiratory distress and cyanotic. Sat 40%. Pt intubated for hypoxic respiratory failure. ICU consulted and accepted pt.  notified admitting about change from hospitalist to ICU. Called and spoke with pt's daughter who is aware of intutbation, pt's critical status, and ICU admission.

## 2020-03-31 NOTE — H&P ADULT - ASSESSMENT
59 y/o male no pmh presents with SOB. EMS reported oxygen 88-89% on RA at home. Pt went to Urgent Care a few days ago and has been taking PO abx. Also with fever and cough. Intubated overnight.     Assessment:  - Acute Hypoxic Respiratory Failure secondary to PNA R/O COVID 19      Plan:  Neuro:  - Sedated. Start Fentanyl and Propofol     CV:  - Will start Vasopresor support if needed      Pulm:  - Acute Hypoxic Resp Failure secondary to PNA R/O COVID 19  - C/W Ventilator support  - Bilateral Opacities concerning for COVID PNA.     ID:  - empiric CAP coverage: Zithromax   - flu negative   - Check Blood Cultures  - Check U/C  - Check COVID Results    Nephro:  - monitor urine output     GI:  - npo for now   - Start Feeding when possible     Heme:  - no indication for transfusion     Endo:  - target CBG < 180  - Start Feeding when possible     Prophy:  - C/W Lovenox     Dispo:  - monitor in the ICU 59 y/o male no pmh presents with SOB. EMS reported oxygen 88-89% on RA at home. Pt went to Urgent Care a few days ago and has been taking PO abx. Also with fever and cough. Intubated overnight. During morning patient self extubated and had to be emergently reintubated by ED attending.     Assessment:  - Acute Hypoxic Respiratory Failure secondary to PNA R/O COVID 19    Plan:  Neuro:  - Sedated. Start Fentanyl and Propofol     CV:  - Will start Vasopressor support if needed      Pulm:  - Acute Hypoxic Resp Failure secondary to PNA R/O COVID 19  - C/W Ventilator support  - Bilateral Opacities concerning for COVID PNA.     ID:  - empiric CAP coverage: Zithromax   - flu negative   - Check Blood Cultures  - Check U/C  - Check COVID Results    Nephro:  - monitor urine output     GI:  - npo for now   - Start Feeding when possible     Heme:  - no indication for transfusion     Endo:  - target CBG < 180  - Start Feeding when possible     Prophy:  - C/W Lovenox     Dispo:  - monitor in the ICU

## 2020-03-31 NOTE — ED PROVIDER NOTE - PHYSICAL EXAMINATION
GENERAL: well appearing, no acute distress   HEAD: atraumatic   EYES: EOMI, pink conjunctiva   ENT: moist oral mucosa   CARDIAC: RRR, no edema, distal pulses present   RESPIRATORY: lungs CTAB, mild increased work of breathing, oxygen sat 88-89% on RA when walking   GASTROINTESTINAL: no abdominal tenderness, no rebound or guarding, bowel sounds presents  GENITOURINARY: no CVA tenderness   MUSCULOSKELETAL: no deformity   NEUROLOGICAL: AAOx3, spontaneous movement of extremities   SKIN: intact   PSYCHIATRIC: cooperative  HEME LYMPH: no lymphadenopathy

## 2020-03-31 NOTE — H&P ADULT - ATTENDING COMMENTS
59 y/o male no pmh presents with SOB. EMS reported oxygen 88-89% on RA at home. Pt went to Urgent Care a few days ago and has been taking PO abx. Also with fever and cough. Intubated overnight. During morning patient self extubated and had to be emergently reintubated by ED attending.     Assessment:  - Acute Hypoxic Respiratory Failure secondary to PNA R/O COVID 19    Plan:  Neuro:  - Sedated. Start Fentanyl and Propofol     CV:  - Will start Vasopressor support if needed      Pulm:  - Acute Hypoxic Resp Failure secondary to PNA R/O COVID 19  - C/W Ventilator support  - Bilateral Opacities concerning for COVID PNA.     ID:  - empiric CAP coverage: Zithromax   - flu negative   - Check Blood Cultures  - Check U/C  - Check COVID Results    Nephro:  - monitor urine output     GI:  - npo for now   - Start Feeding when possible     Heme:  - no indication for transfusion     Endo:  - target CBG < 180  - Start Feeding when possible     Prophy:  - C/W Lovenox     Dispo:  - monitor in the ICU

## 2020-04-01 NOTE — CONSULT NOTE ADULT - SUBJECTIVE AND OBJECTIVE BOX
Time of visit:    CHIEF COMPLAINT: Patient is a 60y old  Male who presents with a chief complaint of COVID 19 (01 Apr 2020 15:46)      HPI:  =================Interval HPI===================  - HPI: 59 y/o male no pmh presents with SOB. EMS reported oxygen 88-89% on RA at home. Pt went to Urgent Care a few days ago and has been taking PO abx. Also with fever and cough. Intubated overnight. During morning patient self extubated and had to be emergently reintubated by ED attending.   ================CHIEF COMPLAINT===============  Patient is a 60y old  Male who presents with a chief complaint of SOB      ============CURRENT MEDICATIONS===============    MEDICATIONS  (STANDING):  azithromycin   Tablet 500 milliGRAM(s) Oral daily  chlorhexidine 0.12% Liquid 15 milliLiter(s) Oral Mucosa every 12 hours  cisatracurium Injectable 10 milliGRAM(s) IV Push once  enoxaparin Injectable 40 milliGRAM(s) SubCutaneous daily  etomidate Injectable 20 milliGRAM(s) IV Push once  fentaNYL   Infusion 0.5 MICROgram(s)/kG/Hr (4.2 mL/Hr) IV Continuous <Continuous>  HYDROmorphone  Injectable 1 milliGRAM(s) IV Push once  midazolam Injectable 4 milliGRAM(s) IV Push once  midazolam Injectable 2 milliGRAM(s) IV Push once  phenylephrine    Infusion 0.5 MICROgram(s)/kG/Min (15.7 mL/Hr) IV Continuous <Continuous>  propofol Infusion 5 MICROgram(s)/kG/Min (2.52 mL/Hr) IV Continuous <Continuous>  succinylcholine Injectable 100 milliGRAM(s) IV Push once    MEDICATIONS  (PRN):        ============REVIEW OF SYSTEMS==================    Unable to obtain   ================VITALS SIGNS=====================  Vital Signs Last 24 Hrs  T(C): 37.6 (31 Mar 2020 04:15), Max: 37.6 (31 Mar 2020 04:15)  T(F): 99.6 (31 Mar 2020 04:15), Max: 99.6 (31 Mar 2020 04:15)  HR: 101 (31 Mar 2020 08:14) (91 - 101)  BP: 140/87 (31 Mar 2020 04:15) (140/87 - 140/87)  BP(mean): --  RR: 20 (31 Mar 2020 04:15) (20 - 20)  SpO2: 93% (31 Mar 2020 08:14) (93% - 95%)    ===============PHYSICAL EXAM====================    GENERAL: Sedated and Intubated  HEENT: Normocephalic;  conjunctivae and sclerae clear; moist mucous membranes;   NECK : supple  CHEST/LUNG: Bilateral crackles  HEART: S1 S2  regular; no murmurs, gallops or rubs  ABDOMEN: Soft, Nontender, Nondistended; Bowel sounds present  EXTREMITIES: no cyanosis; no edema; no calf tenderness  SKIN: warm and dry; no rash  NERVOUS SYSTEM:  Sedated    ==============LABORATORIES======================  LABS:                        12.3   9.75  )-----------( 212      ( 31 Mar 2020 06:39 )             35.7     03-31    137  |  103  |  10  ----------------------------<  129<H>  3.3<L>   |  27  |  1.04    Ca    7.9<L>      31 Mar 2020 06:39    TPro  7.3  /  Alb  2.4<L>  /  TBili  0.6  /  DBili  x   /  AST  62<H>  /  ALT  61<H>  /  AlkPhos  182<H>  03-31        CAPILLARY BLOOD GLUCOSE          =============INPUTS/OUPUTS=====================        RADIOLOGY & ADDITIONAL TESTS:    Imaging Personally Reviewed:  YES    Consultant(s) Notes Reviewed:   YES    Care Discussed with Consultants : YES (31 Mar 2020 08:51)   Patient seen and examined.     PAST MEDICAL & SURGICAL HISTORY:      Allergies    No Known Allergies    Intolerances        MEDICATIONS  (STANDING):  ascorbic acid 1500 milliGRAM(s) Oral four times a day  azithromycin   Tablet 250 milliGRAM(s) Oral daily  cefepime   IVPB 500 milliGRAM(s) IV Intermittent daily  chlorhexidine 0.12% Liquid 15 milliLiter(s) Oral Mucosa every 12 hours  cisatracurium Infusion 3 MICROgram(s)/kG/Min (15.1 mL/Hr) IV Continuous <Continuous>  cisatracurium Injectable 10 milliGRAM(s) IV Push once  dextrose 5% 1000 milliLiter(s) (150 mL/Hr) IV Continuous <Continuous>  etomidate Injectable 20 milliGRAM(s) IV Push once  heparin  Injectable 5000 Unit(s) SubCutaneous every 8 hours  HYDROmorphone  Injectable 1 milliGRAM(s) IV Push once  HYDROmorphone Infusion 1 mG/Hr (1 mL/Hr) IV Continuous <Continuous>  hydroxychloroquine 200 milliGRAM(s) Oral every 12 hours  hydroxychloroquine   Oral   midazolam Injectable 2 milliGRAM(s) IV Push once  norepinephrine Infusion 0.05 MICROgram(s)/kG/Min (4.45 mL/Hr) IV Continuous <Continuous>  pantoprazole    Tablet 40 milliGRAM(s) Oral before breakfast  phenylephrine    Infusion 0.5 MICROgram(s)/kG/Min (7.87 mL/Hr) IV Continuous <Continuous>  propofol Infusion 5 MICROgram(s)/kG/Min (2.52 mL/Hr) IV Continuous <Continuous>  succinylcholine Injectable 100 milliGRAM(s) IV Push once  thiamine 200 milliGRAM(s) Oral every 24 hours  vancomycin  IVPB 1250 milliGRAM(s) IV Intermittent once  vasopressin Infusion 0.04 Unit(s)/Min (2.4 mL/Hr) IV Continuous <Continuous>      MEDICATIONS  (PRN):   Medications up to date at time of exam.    Medications up to date at time of exam.    FAMILY HISTORY:      SOCIAL HISTORY  Smoking History: [   ] smoking/smoke exposure, [   ] former smoker  Living Condition: [   ] apartment, [   ] private house  Work History:   Travel History: denies recent travel  Illicit Substance Use: denies  Alcohol Use: denies    REVIEW OF SYSTEMS:    CONSTITUTIONAL:  denies fevers, chills, sweats, weight loss    HEENT:  denies diplopia or blurred vision, sore throat or runny nose.    CARDIOVASCULAR:  denies pressure, squeezing, tightness, or heaviness about the chest; no palpitations.    RESPIRATORY:  denies SOB, cough, MARRERO, wheezing.    GASTROINTESTINAL:  denies abdominal pain, nausea, vomiting or diarrhea.    GENITOURINARY: denies dysuria, frequency or urgency.    NEUROLOGIC:  denies numbness, tingling, seizures or weakness.    PSYCHIATRIC:  denies disorder of thought or mood.    MSK: denies swelling, redness      PHYSICAL EXAMINATION:    GENERAL: The patient is a well-developed, well-nourished, in no apparent distress.     Vital Signs Last 24 Hrs  T(C): 38.3 (01 Apr 2020 16:00), Max: 38.3 (01 Apr 2020 16:00)  T(F): 101 (01 Apr 2020 16:00), Max: 101 (01 Apr 2020 16:00)  HR: 115 (01 Apr 2020 16:00) (110 - 125)  BP: 100/57 (01 Apr 2020 09:31) (100/57 - 100/57)  BP(mean): 65 (01 Apr 2020 08:00) (65 - 65)  RR: 30 (01 Apr 2020 16:00) (25 - 35)  SpO2: 89% (01 Apr 2020 16:00) (89% - 95%)  Mode: AC/ CMV (Assist Control/ Continuous Mandatory Ventilation)  RR (machine): 30  TV (machine): 420  FiO2: 100  PEEP: 20  ITime: 1  MAP: 28  PIP: 46   (if applicable)    Chest Tube (if applicable)    HEENT: Head is normocephalic and atraumatic. Extraocular muscles are intact. Mucous membranes are moist.     NECK: Supple, no palpable adenopathy.    LUNGS: Clear to auscultation, no wheezing, rales, or rhonchi.    HEART: Regular rate and rhythm without murmur.    ABDOMEN: Soft, nontender, and nondistended.  No hepatosplenomegaly is noted.    RENAL: No difficulty voiding, no pelvic pain    EXTREMITIES: Without any cyanosis, clubbing, rash, lesions or edema.    NEUROLOGIC: Awake, alert, oriented, grossly intact    SKIN: Warm, dry, good turgor.      LABS:                        13.0   34.96 )-----------( 263      ( 01 Apr 2020 09:28 )             42.1     04-01    140  |  100  |  24<H>  ----------------------------<  166<H>  4.7   |  31  |  5.42<H>    Ca    6.9<L>      01 Apr 2020 09:28  Phos  9.3     04-01  Mg     2.3     04-01    TPro  6.0  /  Alb  1.8<L>  /  TBili  1.4<H>  /  DBili  x   /  AST  203<H>  /  ALT  95<H>  /  AlkPhos  179<H>  04-01    PT/INR - ( 01 Apr 2020 06:11 )   PT: 11.8 sec;   INR: 1.04 ratio             ABG - ( 01 Apr 2020 16:39 )  pH, Arterial: 7.02  pH, Blood: x     /  pCO2: 116   /  pO2: 71    / HCO3: 28    / Base Excess: -6.2  /  SaO2: 92                CARDIAC MARKERS ( 01 Apr 2020 15:20 )  5.930 ng/mL / x     / 2859 U/L / x     / 25.8 ng/mL  CARDIAC MARKERS ( 01 Apr 2020 06:11 )  4.840 ng/mL / x     / 809 U/L / x     / x          D-Dimer Assay, Quantitative: 6975 ng/mL DDU (04-01-20 @ 06:11)      Lactate, Blood: 3.1 mmol/L (04-01-20 @ 06:06)    Procalcitonin, Serum: 196.80 ng/mL (04-01-20 @ 09:35)      MICROBIOLOGY: (if applicable)    RADIOLOGY & ADDITIONAL STUDIES:  EKG:   CXR:  ECHO:    IMPRESSION: 60y Male PAST MEDICAL & SURGICAL HISTORY:   p/w                   RECOMMENDATIONS: Time of visit:    CHIEF COMPLAINT: Patient is a 60y old  Male who presents with a chief complaint of COVID 19 (01 Apr 2020 15:46)      HPI:  =================Interval HPI===================  - HPI: 59 y/o male no pmh presents with SOB. EMS reported oxygen 88-89% on RA at home. Pt went to Urgent Care a few days ago and has been taking PO abx. Also with fever and cough. Intubated overnight. During morning patient self extubated and had to be emergently reintubated by ED attending.   ================CHIEF COMPLAINT===============  Patient is a 60y old  Male who presents with a chief complaint of SOB      ============CURRENT MEDICATIONS===============    MEDICATIONS  (STANDING):  azithromycin   Tablet 500 milliGRAM(s) Oral daily  chlorhexidine 0.12% Liquid 15 milliLiter(s) Oral Mucosa every 12 hours  cisatracurium Injectable 10 milliGRAM(s) IV Push once  enoxaparin Injectable 40 milliGRAM(s) SubCutaneous daily  etomidate Injectable 20 milliGRAM(s) IV Push once  fentaNYL   Infusion 0.5 MICROgram(s)/kG/Hr (4.2 mL/Hr) IV Continuous <Continuous>  HYDROmorphone  Injectable 1 milliGRAM(s) IV Push once  midazolam Injectable 4 milliGRAM(s) IV Push once  midazolam Injectable 2 milliGRAM(s) IV Push once  phenylephrine    Infusion 0.5 MICROgram(s)/kG/Min (15.7 mL/Hr) IV Continuous <Continuous>  propofol Infusion 5 MICROgram(s)/kG/Min (2.52 mL/Hr) IV Continuous <Continuous>  succinylcholine Injectable 100 milliGRAM(s) IV Push once    MEDICATIONS  (PRN):        ============REVIEW OF SYSTEMS==================    Unable to obtain   ================VITALS SIGNS=====================  Vital Signs Last 24 Hrs  T(C): 37.6 (31 Mar 2020 04:15), Max: 37.6 (31 Mar 2020 04:15)  T(F): 99.6 (31 Mar 2020 04:15), Max: 99.6 (31 Mar 2020 04:15)  HR: 101 (31 Mar 2020 08:14) (91 - 101)  BP: 140/87 (31 Mar 2020 04:15) (140/87 - 140/87)  BP(mean): --  RR: 20 (31 Mar 2020 04:15) (20 - 20)  SpO2: 93% (31 Mar 2020 08:14) (93% - 95%)    ===============PHYSICAL EXAM====================    GENERAL: Sedated and Intubated  HEENT: Normocephalic;  conjunctivae and sclerae clear; moist mucous membranes;   NECK : supple  CHEST/LUNG: Bilateral crackles  HEART: S1 S2  regular; no murmurs, gallops or rubs  ABDOMEN: Soft, Nontender, Nondistended; Bowel sounds present  EXTREMITIES: no cyanosis; no edema; no calf tenderness  SKIN: warm and dry; no rash  NERVOUS SYSTEM:  Sedated    ==============LABORATORIES======================  LABS:                        12.3   9.75  )-----------( 212      ( 31 Mar 2020 06:39 )             35.7     03-31    137  |  103  |  10  ----------------------------<  129<H>  3.3<L>   |  27  |  1.04    Ca    7.9<L>      31 Mar 2020 06:39    TPro  7.3  /  Alb  2.4<L>  /  TBili  0.6  /  DBili  x   /  AST  62<H>  /  ALT  61<H>  /  AlkPhos  182<H>  03-31        CAPILLARY BLOOD GLUCOSE          =============INPUTS/OUPUTS=====================        RADIOLOGY & ADDITIONAL TESTS:    Imaging Personally Reviewed:  YES    Consultant(s) Notes Reviewed:   YES    Care Discussed with Consultants : YES (31 Mar 2020 08:51)   Patient seen and examined.     PAST MEDICAL & SURGICAL HISTORY:      Allergies    No Known Allergies    Intolerances        MEDICATIONS  (STANDING):  ascorbic acid 1500 milliGRAM(s) Oral four times a day  azithromycin   Tablet 250 milliGRAM(s) Oral daily  cefepime   IVPB 500 milliGRAM(s) IV Intermittent daily  chlorhexidine 0.12% Liquid 15 milliLiter(s) Oral Mucosa every 12 hours  cisatracurium Infusion 3 MICROgram(s)/kG/Min (15.1 mL/Hr) IV Continuous <Continuous>  cisatracurium Injectable 10 milliGRAM(s) IV Push once  dextrose 5% 1000 milliLiter(s) (150 mL/Hr) IV Continuous <Continuous>  etomidate Injectable 20 milliGRAM(s) IV Push once  heparin  Injectable 5000 Unit(s) SubCutaneous every 8 hours  HYDROmorphone  Injectable 1 milliGRAM(s) IV Push once  HYDROmorphone Infusion 1 mG/Hr (1 mL/Hr) IV Continuous <Continuous>  hydroxychloroquine 200 milliGRAM(s) Oral every 12 hours  hydroxychloroquine   Oral   midazolam Injectable 2 milliGRAM(s) IV Push once  norepinephrine Infusion 0.05 MICROgram(s)/kG/Min (4.45 mL/Hr) IV Continuous <Continuous>  pantoprazole    Tablet 40 milliGRAM(s) Oral before breakfast  phenylephrine    Infusion 0.5 MICROgram(s)/kG/Min (7.87 mL/Hr) IV Continuous <Continuous>  propofol Infusion 5 MICROgram(s)/kG/Min (2.52 mL/Hr) IV Continuous <Continuous>  succinylcholine Injectable 100 milliGRAM(s) IV Push once  thiamine 200 milliGRAM(s) Oral every 24 hours  vancomycin  IVPB 1250 milliGRAM(s) IV Intermittent once  vasopressin Infusion 0.04 Unit(s)/Min (2.4 mL/Hr) IV Continuous <Continuous>      MEDICATIONS  (PRN):   Medications up to date at time of exam.    Medications up to date at time of exam.    FAMILY HISTORY:      SOCIAL HISTORY  Smoking History: [   ] smoking/smoke exposure, [   ] former smoker  Living Condition: [   ] apartment, [   ] private house  Work History:   Travel History: denies recent travel  Illicit Substance Use: denies  Alcohol Use: denies    REVIEW OF SYSTEMS:    CONSTITUTIONAL:  denies fevers, chills, sweats, weight loss    HEENT:  denies diplopia or blurred vision, sore throat or runny nose.    CARDIOVASCULAR:  denies pressure, squeezing, tightness, or heaviness about the chest; no palpitations.    RESPIRATORY:  denies SOB, cough, MARRERO, wheezing.    GASTROINTESTINAL:  denies abdominal pain, nausea, vomiting or diarrhea.    GENITOURINARY: denies dysuria, frequency or urgency.    NEUROLOGIC:  denies numbness, tingling, seizures or weakness.    PSYCHIATRIC:  denies disorder of thought or mood.    MSK: denies swelling, redness      PHYSICAL EXAMINATION:    GENERAL: The patient is a well-developed, well-nourished, in no apparent distress.     Vital Signs Last 24 Hrs  T(C): 38.3 (01 Apr 2020 16:00), Max: 38.3 (01 Apr 2020 16:00)  T(F): 101 (01 Apr 2020 16:00), Max: 101 (01 Apr 2020 16:00)  HR: 115 (01 Apr 2020 16:00) (110 - 125)  BP: 100/57 (01 Apr 2020 09:31) (100/57 - 100/57)  BP(mean): 65 (01 Apr 2020 08:00) (65 - 65)  RR: 30 (01 Apr 2020 16:00) (25 - 35)  SpO2: 89% (01 Apr 2020 16:00) (89% - 95%)  Mode: AC/ CMV (Assist Control/ Continuous Mandatory Ventilation)  RR (machine): 30  TV (machine): 420  FiO2: 100  PEEP: 20  ITime: 1  MAP: 28  PIP: 46   (if applicable)    Chest Tube (if applicable)    HEENT: Head is normocephalic and atraumatic. Extraocular muscles are intact. Mucous membranes are moist.  +ETT    NECK: Supple, no palpable adenopathy.    LUNGS: Clear to auscultation, no wheezing, rales, or rhonchi.    HEART: Regular rate and rhythm without murmur.    ABDOMEN: Soft, nontender, and nondistended.  No hepatosplenomegaly is noted.    RENAL: No difficulty voiding, no pelvic pain    EXTREMITIES: + edema    NEUROLOGIC: sedated    SKIN: Warm, dry, good turgor.      LABS:                        13.0   34.96 )-----------( 263      ( 01 Apr 2020 09:28 )             42.1     04-01    140  |  100  |  24<H>  ----------------------------<  166<H>  4.7   |  31  |  5.42<H>    Ca    6.9<L>      01 Apr 2020 09:28  Phos  9.3     04-01  Mg     2.3     04-01    TPro  6.0  /  Alb  1.8<L>  /  TBili  1.4<H>  /  DBili  x   /  AST  203<H>  /  ALT  95<H>  /  AlkPhos  179<H>  04-01    PT/INR - ( 01 Apr 2020 06:11 )   PT: 11.8 sec;   INR: 1.04 ratio             ABG - ( 01 Apr 2020 16:39 )  pH, Arterial: 7.02  pH, Blood: x     /  pCO2: 116   /  pO2: 71    / HCO3: 28    / Base Excess: -6.2  /  SaO2: 92                CARDIAC MARKERS ( 01 Apr 2020 15:20 )  5.930 ng/mL / x     / 2859 U/L / x     / 25.8 ng/mL  CARDIAC MARKERS ( 01 Apr 2020 06:11 )  4.840 ng/mL / x     / 809 U/L / x     / x          D-Dimer Assay, Quantitative: 6975 ng/mL DDU (04-01-20 @ 06:11)      Lactate, Blood: 3.1 mmol/L (04-01-20 @ 06:06)    Procalcitonin, Serum: 196.80 ng/mL (04-01-20 @ 09:35)      MICROBIOLOGY: (if applicable)    RADIOLOGY & ADDITIONAL STUDIES:  EKG:   CXR:< from: Xray Chest 1 View- PORTABLE-Routine (04.01.20 @ 09:44) >    EXAM:  XR CHEST PORTABLE ROUTINE 1V                            PROCEDURE DATE:  04/01/2020          INTERPRETATION:  Portable chest x-rays    Indication: Shortness of breath. COVID-19.    Portable chest x-ray is compared to a previous examination dated 3/31/2020.    Impression: ET tube terminates at 7.2 cm above the ronald. The distal portion of the NG tube is excluded from the radiograph.    The costophrenic angles are excluded from the radiograph. No gross pleural effusion.    Possible new tiny left apical pneumothorax.    Airspace opacifications in both lungs, mildly improved since the previous examination.    Grossly stable cardiac silhouette.    Dr. Hutchison is informed.                JALEN PATIÑO M.D., ATTENDING RADIOLOGIST  This document has been electronically signed. Apr 1 2020  9:26AM                < end of copied text >    ECHO:    IMPRESSION: 60y Male PAST MEDICAL & SURGICAL HISTORY:   p/w           This is a 60 yr old man in admitted for acute hypoxic resp failure    -ARDS  -acute hypoxic resp failure  -MOSF  -JULES    Sugg:  -continue vent support with lung protective strategy  -plaquenil  -isolate  -antibx  -hemodynamic support    time spent 39 min Time of visit:    CHIEF COMPLAINT: Patient is a 60y old  Male who presents with a chief complaint of COVID 19 (01 Apr 2020 15:46)      HPI:  =================Interval HPI===================  - HPI: 59 y/o male no pmh presents with SOB. EMS reported oxygen 88-89% on RA at home. Pt went to Urgent Care a few days ago and has been taking PO abx. Also with fever and cough. Intubated overnight. During morning patient self extubated and had to be emergently reintubated by ED attending.   ================CHIEF COMPLAINT===============  Patient is a 60y old  Male who presents with a chief complaint of SOB      ============CURRENT MEDICATIONS===============    MEDICATIONS  (STANDING):  azithromycin   Tablet 500 milliGRAM(s) Oral daily  chlorhexidine 0.12% Liquid 15 milliLiter(s) Oral Mucosa every 12 hours  cisatracurium Injectable 10 milliGRAM(s) IV Push once  enoxaparin Injectable 40 milliGRAM(s) SubCutaneous daily  etomidate Injectable 20 milliGRAM(s) IV Push once  fentaNYL   Infusion 0.5 MICROgram(s)/kG/Hr (4.2 mL/Hr) IV Continuous <Continuous>  HYDROmorphone  Injectable 1 milliGRAM(s) IV Push once  midazolam Injectable 4 milliGRAM(s) IV Push once  midazolam Injectable 2 milliGRAM(s) IV Push once  phenylephrine    Infusion 0.5 MICROgram(s)/kG/Min (15.7 mL/Hr) IV Continuous <Continuous>  propofol Infusion 5 MICROgram(s)/kG/Min (2.52 mL/Hr) IV Continuous <Continuous>  succinylcholine Injectable 100 milliGRAM(s) IV Push once    MEDICATIONS  (PRN):        ============REVIEW OF SYSTEMS==================    Unable to obtain   ================VITALS SIGNS=====================  Vital Signs Last 24 Hrs  T(C): 37.6 (31 Mar 2020 04:15), Max: 37.6 (31 Mar 2020 04:15)  T(F): 99.6 (31 Mar 2020 04:15), Max: 99.6 (31 Mar 2020 04:15)  HR: 101 (31 Mar 2020 08:14) (91 - 101)  BP: 140/87 (31 Mar 2020 04:15) (140/87 - 140/87)  BP(mean): --  RR: 20 (31 Mar 2020 04:15) (20 - 20)  SpO2: 93% (31 Mar 2020 08:14) (93% - 95%)    ===============PHYSICAL EXAM====================    GENERAL: Sedated and Intubated  HEENT: Normocephalic;  conjunctivae and sclerae clear; moist mucous membranes;   NECK : supple  CHEST/LUNG: Bilateral crackles  HEART: S1 S2  regular; no murmurs, gallops or rubs  ABDOMEN: Soft, Nontender, Nondistended; Bowel sounds present  EXTREMITIES: no cyanosis; no edema; no calf tenderness  SKIN: warm and dry; no rash  NERVOUS SYSTEM:  Sedated    ==============LABORATORIES======================  LABS:                        12.3   9.75  )-----------( 212      ( 31 Mar 2020 06:39 )             35.7     03-31    137  |  103  |  10  ----------------------------<  129<H>  3.3<L>   |  27  |  1.04    Ca    7.9<L>      31 Mar 2020 06:39    TPro  7.3  /  Alb  2.4<L>  /  TBili  0.6  /  DBili  x   /  AST  62<H>  /  ALT  61<H>  /  AlkPhos  182<H>  03-31        CAPILLARY BLOOD GLUCOSE          =============INPUTS/OUPUTS=====================        RADIOLOGY & ADDITIONAL TESTS:    Imaging Personally Reviewed:  YES    Consultant(s) Notes Reviewed:   YES    Care Discussed with Consultants : YES (31 Mar 2020 08:51)   Patient seen and examined.     PAST MEDICAL & SURGICAL HISTORY:      Allergies    No Known Allergies    Intolerances        MEDICATIONS  (STANDING):  ascorbic acid 1500 milliGRAM(s) Oral four times a day  azithromycin   Tablet 250 milliGRAM(s) Oral daily  cefepime   IVPB 500 milliGRAM(s) IV Intermittent daily  chlorhexidine 0.12% Liquid 15 milliLiter(s) Oral Mucosa every 12 hours  cisatracurium Infusion 3 MICROgram(s)/kG/Min (15.1 mL/Hr) IV Continuous <Continuous>  cisatracurium Injectable 10 milliGRAM(s) IV Push once  dextrose 5% 1000 milliLiter(s) (150 mL/Hr) IV Continuous <Continuous>  etomidate Injectable 20 milliGRAM(s) IV Push once  heparin  Injectable 5000 Unit(s) SubCutaneous every 8 hours  HYDROmorphone  Injectable 1 milliGRAM(s) IV Push once  HYDROmorphone Infusion 1 mG/Hr (1 mL/Hr) IV Continuous <Continuous>  hydroxychloroquine 200 milliGRAM(s) Oral every 12 hours  hydroxychloroquine   Oral   midazolam Injectable 2 milliGRAM(s) IV Push once  norepinephrine Infusion 0.05 MICROgram(s)/kG/Min (4.45 mL/Hr) IV Continuous <Continuous>  pantoprazole    Tablet 40 milliGRAM(s) Oral before breakfast  phenylephrine    Infusion 0.5 MICROgram(s)/kG/Min (7.87 mL/Hr) IV Continuous <Continuous>  propofol Infusion 5 MICROgram(s)/kG/Min (2.52 mL/Hr) IV Continuous <Continuous>  succinylcholine Injectable 100 milliGRAM(s) IV Push once  thiamine 200 milliGRAM(s) Oral every 24 hours  vancomycin  IVPB 1250 milliGRAM(s) IV Intermittent once  vasopressin Infusion 0.04 Unit(s)/Min (2.4 mL/Hr) IV Continuous <Continuous>      MEDICATIONS  (PRN):   Medications up to date at time of exam.    Medications up to date at time of exam.    FAMILY HISTORY:      SOCIAL HISTORY  Smoking History: [   ] smoking/smoke exposure, [   ] former smoker  Living Condition: [   ] apartment, [   ] private house  Work History:   Travel History: denies recent travel  Illicit Substance Use: denies  Alcohol Use: denies    REVIEW OF SYSTEMS:    CONSTITUTIONAL:  denies fevers, chills, sweats, weight loss    HEENT:  denies diplopia or blurred vision, sore throat or runny nose.    CARDIOVASCULAR:  denies pressure, squeezing, tightness, or heaviness about the chest; no palpitations.    RESPIRATORY:  denies SOB, cough, MARRERO, wheezing.    GASTROINTESTINAL:  denies abdominal pain, nausea, vomiting or diarrhea.    GENITOURINARY: denies dysuria, frequency or urgency.    NEUROLOGIC:  denies numbness, tingling, seizures or weakness.    PSYCHIATRIC:  denies disorder of thought or mood.    MSK: denies swelling, redness      PHYSICAL EXAMINATION:    GENERAL: The patient is a well-developed, well-nourished, in no apparent distress.     Vital Signs Last 24 Hrs  T(C): 38.3 (01 Apr 2020 16:00), Max: 38.3 (01 Apr 2020 16:00)  T(F): 101 (01 Apr 2020 16:00), Max: 101 (01 Apr 2020 16:00)  HR: 115 (01 Apr 2020 16:00) (110 - 125)  BP: 100/57 (01 Apr 2020 09:31) (100/57 - 100/57)  BP(mean): 65 (01 Apr 2020 08:00) (65 - 65)  RR: 30 (01 Apr 2020 16:00) (25 - 35)  SpO2: 89% (01 Apr 2020 16:00) (89% - 95%)  Mode: AC/ CMV (Assist Control/ Continuous Mandatory Ventilation)  RR (machine): 30  TV (machine): 420  FiO2: 100  PEEP: 20  ITime: 1  MAP: 28  PIP: 46   (if applicable)    Chest Tube (if applicable)    HEENT: Head is normocephalic and atraumatic. Extraocular muscles are intact. Mucous membranes are moist.  +ETT    NECK: Supple, no palpable adenopathy.    LUNGS: Clear to auscultation, no wheezing, rales, or rhonchi.    HEART: Regular rate and rhythm without murmur.    ABDOMEN: Soft, nontender, and nondistended.  No hepatosplenomegaly is noted.    RENAL: No difficulty voiding, no pelvic pain    EXTREMITIES: + edema    NEUROLOGIC: sedated    SKIN: Warm, dry, good turgor.      LABS:                        13.0   34.96 )-----------( 263      ( 01 Apr 2020 09:28 )             42.1     04-01    140  |  100  |  24<H>  ----------------------------<  166<H>  4.7   |  31  |  5.42<H>    Ca    6.9<L>      01 Apr 2020 09:28  Phos  9.3     04-01  Mg     2.3     04-01    TPro  6.0  /  Alb  1.8<L>  /  TBili  1.4<H>  /  DBili  x   /  AST  203<H>  /  ALT  95<H>  /  AlkPhos  179<H>  04-01    PT/INR - ( 01 Apr 2020 06:11 )   PT: 11.8 sec;   INR: 1.04 ratio             ABG - ( 01 Apr 2020 16:39 )  pH, Arterial: 7.02  pH, Blood: x     /  pCO2: 116   /  pO2: 71    / HCO3: 28    / Base Excess: -6.2  /  SaO2: 92                CARDIAC MARKERS ( 01 Apr 2020 15:20 )  5.930 ng/mL / x     / 2859 U/L / x     / 25.8 ng/mL  CARDIAC MARKERS ( 01 Apr 2020 06:11 )  4.840 ng/mL / x     / 809 U/L / x     / x          D-Dimer Assay, Quantitative: 6975 ng/mL DDU (04-01-20 @ 06:11)      Lactate, Blood: 3.1 mmol/L (04-01-20 @ 06:06)    Procalcitonin, Serum: 196.80 ng/mL (04-01-20 @ 09:35)      MICROBIOLOGY: (if applicable)    RADIOLOGY & ADDITIONAL STUDIES:  EKG:   CXR:< from: Xray Chest 1 View- PORTABLE-Routine (04.01.20 @ 09:44) >    EXAM:  XR CHEST PORTABLE ROUTINE 1V                            PROCEDURE DATE:  04/01/2020          INTERPRETATION:  Portable chest x-rays    Indication: Shortness of breath. COVID-19.    Portable chest x-ray is compared to a previous examination dated 3/31/2020.    Impression: ET tube terminates at 7.2 cm above the ronald. The distal portion of the NG tube is excluded from the radiograph.    The costophrenic angles are excluded from the radiograph. No gross pleural effusion.    Possible new tiny left apical pneumothorax.    Airspace opacifications in both lungs, mildly improved since the previous examination.    Grossly stable cardiac silhouette.    Dr. Hutchison is informed.                JALEN PATIÑO M.D., ATTENDING RADIOLOGIST  This document has been electronically signed. Apr 1 2020  9:26AM                < end of copied text >    ECHO:    IMPRESSION: 60y Male PAST MEDICAL & SURGICAL HISTORY:   p/w           This is a 60 yr old man in admitted for acute hypoxic resp failure    -ARDS  -acute hypoxic resp failure  -MOSF  -JULES  -Septic shock on pressors      Sugg:  -continue vent support with lung protective strategy  -plaquenil  -isolate  -antibx  -hemodynamic support  -continue presors to maintain MAP>65    time spent 39 min

## 2020-04-01 NOTE — CHART NOTE - NSCHARTNOTEFT_GEN_A_CORE
Note Type	 ICU Attending      COVID-19: [  ] confirmed    /    [ x ] suspected    /    [  ] ruled out    [x  ] acute respiratory failure with hypoxemia    /   [x  ] hypercapnia    /    [  x] ARDS  ----[x  ] mechanical ventilation  ----[x  ] high PEEP  ----[ x ] continuous sedation to synchronize with mechanical ventilator  ----[  ] paralysis  ----[ x ] prone positioning    FiO2 - 100% FIO2  PEEP - 20  SpO2 - ~90%    [ x ] septic shock secondary to COVID-19 requiring:  ----[ x ] invasive hemodynamic monitoring  ----[ x ] vasopressors    [ x ] acute kidney injury secondary to septic shock requiring:  ----[ x ] intensive fluid management in the setting of ARDS  ----[  ] renal replacement therapy    The patient is a critical care patient with life threatening hemodynamic and metabolic instability in SICU.  I have personally interviewed when possible and examined the patient, reviewed data and laboratory tests/x-rays and all pertinent electronic images.  I was physically present for the key portions of the evaluation and management (E/M) service provided.   The SICU team has a constant risk benefit analyzes discussion with the primary team, all consultants, House Staff and PA's on all decisions.  These diagnoses are unrelated to the surgical procedure noted above.  I meet with family if needed to get further history, discuss the case and make care decisions for this patient who might not be able to participate.  Time involved in performance of separately billable procedures was not counted toward my critical care time. There is no overlap.  I spent 55-75 minutes ( 0800Hrs-0915Hrs in AM/ 1600Hrs-1715Hrs in PM, or other time indicated) of critical care time for the diagnoses, assessment, plan and interventions.  This time excludes time spent on separate procedures and teaching. Note Type	 ICU Attending      COVID-19: [  ] confirmed    /    [ x ] suspected    /    [  ] ruled out    [x  ] acute respiratory failure with hypoxemia    /   [x  ] hypercapnia    /    [  x] ARDS  ----[x  ] mechanical ventilation  ----[x  ] high PEEP  ----[ x ] continuous sedation to synchronize with mechanical ventilator  ----[  ] paralysis  ----[ x ] prone positioning    FiO2 - 100% FIO2  PEEP - 20  SpO2 - ~90%    [ x ] septic shock secondary to COVID-19 requiring:  ----[ x ] invasive hemodynamic monitoring  ----[ x ] vasopressors    [ x ] acute kidney injury secondary to septic shock requiring:  ----[ x ] intensive fluid management in the setting of ARDS  ----[  ] renal replacement therapy    Patient with worsening acidosis, started on bicarb gtt, restarted PPI, and plan for NGT once supinated.     The patient is a critical care patient with life threatening hemodynamic and metabolic instability in SICU.  I have personally interviewed when possible and examined the patient, reviewed data and laboratory tests/x-rays and all pertinent electronic images.  I was physically present for the key portions of the evaluation and management (E/M) service provided.   The SICU team has a constant risk benefit analyzes discussion with the primary team, all consultants, House Staff and PA's on all decisions.  These diagnoses are unrelated to the surgical procedure noted above.  I meet with family if needed to get further history, discuss the case and make care decisions for this patient who might not be able to participate.  Time involved in performance of separately billable procedures was not counted toward my critical care time. There is no overlap.  I spent 55-75 minutes ( 0800Hrs-0915Hrs in AM/ 1600Hrs-1715Hrs in PM, or other time indicated) of critical care time for the diagnoses, assessment, plan and interventions.  This time excludes time spent on separate procedures and teaching.

## 2020-04-01 NOTE — PROGRESS NOTE PEDS - ATTENDING COMMENTS
acute hypoxic respiratory distress requiring intubation and sedation. Imaging c/w ARDS. Admitted to MICU for management.  #Sepsis 2/2 COVID+ #COVID+  #Hypoxic respiratory failure 2/2 ARDS 2/2 COVID+  - CXR on arrival with BL multilobar interstitial opacities c/w ARDS 2/2 COVID+   intubated and sedated for increased work of breathing now breathing in sync with vent  RR increased to 35, FiO2 90, he is proned, and on nimbex, will prone him till 3 pm, and repeat ABG at 3 pm  advance ET 2 cm when supine   lasix 100 mg for volume overload, moniotr I/O  , watch, correct electrolyte imbalance   hypotensive on phenylephrine, NE and vasopressin   CODE STATUS DNR

## 2020-04-01 NOTE — PROGRESS NOTE PEDS - ASSESSMENT
61 y/o male no pmh presents with SOB. EMS reported oxygen 88-89% on RA at home. Pt went to Urgent Care a few days ago and has been taking PO abx. Also with fever and cough. Intubated overnight. During morning patient self extubated and had to be emergently reintubated by ED attending. acute hypoxic respiratory distress requiring intubation and sedation. Imaging c/w ARDS. Admitted to MICU for further observation and management.    Assessment:  - Acute Hypoxic Respiratory Failure secondary to PNA R/O COVID 19    Plan:  Neuro:  - Sedated. Start Fentanyl and Propofol and paralyzed for better compliance     CV:  - septic shock   - on Vasopressor support     Pulm:  - Acute Hypoxic Resp Failure secondary to PNA R/O COVID 19  - ABG 7/119/83/28- will repeat at 11pm Vent settings 35/420/90/20  - C/W Ventilator support  - azithro and plaquenil   - pt proned today   - Bilateral Opacities concerning for COVID PNA. pending results     ID:  - empiric CAP coverage: Zithromax   - wbc 35k   - cw plaquenil vit c and thiamine   - flu negative   - Check Blood Cultures  - Check U/C  - Check COVID Results    Nephro:  - monitor urine output - dec outpt   - creatinine jumped to 5.4 today   - Nephro consulted Dr. Leija    GI:  - NGT   - Start Feeding TF   - PPI     Heme:  - no indication for transfusion  - WBC 25k      Endo:  - target CBG < 180  - Feeding with NGT     Prophy:  - C/W HEPARIN and PPI     Dispo:  - monitor in the ICU

## 2020-04-01 NOTE — CONSULT NOTE ADULT - ASSESSMENT
Patient is a 60y Male with no PMH p/w fever, cough and SOB. Pt a/w Acute hypoxic respiratory failure s/p intubated with ARDS, Septic shock due to COVID-19, and Hypotension on pressors. Nephrology consulted for Elevated serum creatinine.    1. JULES- SCr 1.04 on 3/31. Now with rapidly worsening JULES likely hemodynamically mediated in the setting of septic shock / infection 2/2 COVID-19, hypotension on pressors;  likely ATN. Check UA and urine lytes.   Pt now with poor urine o/p. However, pt with low BP requiring 3 pressors; hemodynamically unstable at this time for intermittent HD ([facility]H not available at [facility]). Check HepBsAg.   Will defer Renal US due to COVID status.   Strict I/Os. Avoid nephrotoxins/ NSAIDs/ RCA. Monitor BMP.  2. Septic Shock due to Multifocal PNA 2/2 COVID-19- Plan as per ICU  3. Hypotension- Low BP; pt on 3 Pressors as per ICU. Monitor BP  4. Acute hypoxic respiratory failure- vent support as per ICU.    Pt with severe respiratory acidosis despite being  on vent with low BP on 3 pressors; overall poor prognosis.

## 2020-04-01 NOTE — DISCHARGE NOTE FOR THE EXPIRED PATIENT - HOSPITAL COURSE
59 y/o male no pmh presents with SOB. EMS reported oxygen 88-89% on RA at home. Pt went to Urgent Care a few days ago and has been taking PO abx. Also with fever and cough. Intubated overnight. During morning patient self extubated and had to be emergently reintubated by ED attending. He was admitted to ICU for Acute Hypoxic respiratory failure secondary to Covid 19. Patient' was started on Zithromax , Ceftriaxone and Plaquenil. He later went in to multi-organ failure. His ABG showed Respiratory acidosis, vent settings were adjusted. Patient's goals of care were discussed with family in detail, they were aware of poor prognosis and was made DNR/DNI.   Patient  at 10:35pm. Family was notified.

## 2020-04-01 NOTE — PROCEDURE NOTE - NSPROCDETAILS_GEN_ALL_CORE
positive blood return obtained via catheter/sutured in place/ultrasound guidance/location identified, draped/prepped, sterile technique used, needle inserted/introduced/hemostasis with direct pressure, dressing applied/connected to a pressurized flush line/Seldinger technique

## 2020-04-01 NOTE — CONSULT NOTE ADULT - SUBJECTIVE AND OBJECTIVE BOX
[facility] NEPHROLOGY- CONSULTATION NOTE    Patient is a 60y Male with no PMH p/w fever, cough and SOB. Pt a/w Acute hypoxic respiratory failure s/p intubated with ARDS, Septic shock due to COVID-19, and Hypotension on pressors. Nephrology consulted for Elevated serum creatinine.      Unable to obtain history from patient.     PAST MEDICAL & SURGICAL HISTORY:    No Known Allergies    Home Medications Reviewed  Hospital Medications:   MEDICATIONS  (STANDING):  ascorbic acid 1500 milliGRAM(s) Oral four times a day  azithromycin   Tablet 250 milliGRAM(s) Oral daily  chlorhexidine 0.12% Liquid 15 milliLiter(s) Oral Mucosa every 12 hours  cisatracurium Infusion 3 MICROgram(s)/kG/Min (15.1 mL/Hr) IV Continuous <Continuous>  cisatracurium Injectable 10 milliGRAM(s) IV Push once  dextrose 5% 1000 milliLiter(s) (150 mL/Hr) IV Continuous <Continuous>  etomidate Injectable 20 milliGRAM(s) IV Push once  heparin  Injectable 5000 Unit(s) SubCutaneous every 8 hours  HYDROmorphone  Injectable 1 milliGRAM(s) IV Push once  HYDROmorphone Infusion 1 mG/Hr (1 mL/Hr) IV Continuous <Continuous>  hydroxychloroquine 200 milliGRAM(s) Oral every 12 hours  hydroxychloroquine   Oral   midazolam Injectable 2 milliGRAM(s) IV Push once  norepinephrine Infusion 0.05 MICROgram(s)/kG/Min (4.45 mL/Hr) IV Continuous <Continuous>  pantoprazole    Tablet 40 milliGRAM(s) Oral before breakfast  phenylephrine    Infusion 0.5 MICROgram(s)/kG/Min (7.87 mL/Hr) IV Continuous <Continuous>  propofol Infusion 5 MICROgram(s)/kG/Min (2.52 mL/Hr) IV Continuous <Continuous>  succinylcholine Injectable 100 milliGRAM(s) IV Push once  thiamine 200 milliGRAM(s) Oral every 24 hours  vasopressin Infusion 0.04 Unit(s)/Min (2.4 mL/Hr) IV Continuous <Continuous>      REVIEW OF SYSTEMS: Unable to obtain/ intubated    VITALS:  T(F): 100.4 (04-01-20 @ 12:00), Max: 100.5 (03-31-20 @ 16:30)  HR: 119 (04-01-20 @ 14:52)  BP: 100/57 (04-01-20 @ 09:31)  RR: 35 (04-01-20 @ 14:52)  SpO2: 92% (04-01-20 @ 14:52)  Wt(kg): --    03-31 @ 07:01  -  04-01 @ 07:00  --------------------------------------------------------  IN: 2353.2 mL / OUT: 560 mL / NET: 1793.2 mL    04-01 @ 07:01  -  04-01 @ 15:46  --------------------------------------------------------  IN: 219.7 mL / OUT: 15 mL / NET: 204.7 mL      Defer physical exam due to COVID-19 status. Please refer to primary team's note for physical exam.      LABS:  04-01    140  |  100  |  24<H>  ----------------------------<  166<H>  4.7   |  31  |  5.42<H>    Ca    6.9<L>      01 Apr 2020 09:28  Phos  9.3     04-01  Mg     2.3     04-01    TPro  6.0  /  Alb  1.8<L>  /  TBili  1.4<H>  /  DBili      /  AST  203<H>  /  ALT  95<H>  /  AlkPhos  179<H>  04-01    Creatinine Trend: 5.42 <--, 4.54 <--, 1.04 <--                        13.0   34.96 )-----------( 263      ( 01 Apr 2020 09:28 )             42.1     Urine Studies:      RADIOLOGY & ADDITIONAL STUDIES:        < from: Xray Chest 1 View- PORTABLE-Routine (04.01.20 @ 09:44) >    EXAM:  XR CHEST PORTABLE ROUTINE 1V                            PROCEDURE DATE:  04/01/2020          INTERPRETATION:  Portable chest x-rays    Indication: Shortness of breath. COVID-19.    Portable chest x-ray is compared to a previous examination dated 3/31/2020.    Impression: ET tube terminates at 7.2 cm above the ronald. The distal portion of the NG tube is excluded from the radiograph.    The costophrenic angles are excluded from the radiograph. No gross pleural effusion.    Possible new tiny left apical pneumothorax.    Airspace opacifications in both lungs, mildly improved since the previous examination.    Grossly stable cardiac silhouette.    Dr. Hutchison is informed.        < end of copied text >

## 2020-04-01 NOTE — CHART NOTE - NSCHARTNOTEFT_GEN_A_CORE
Spoke with patient's son/Bud and daughter/Cassie ([phone]) on the phone. Discussed status. Aware patient requiring 3 pressors, troponin 5.930, worsening renal failure and hemodynamically unstable at this time for intermittent HD as per nephro. Family aware of very poor prognosis due to multi-organ failure. Discussed risks vs benefits of resuscitation. Family wants more time to think it over. Provided cell number if any questions/concerns. At this time, patient remains a full code. All questions answered; supportive counseling provided.

## 2020-04-01 NOTE — PROGRESS NOTE PEDS - SUBJECTIVE AND OBJECTIVE BOX
CHIEF COMPLAINT: COVID-19    Interval Events:    REVIEW OF SYSTEMS:  Constitutional: [ ] negative [ ] fevers [ ] chills [ ] weight loss [ ] weight gain  HEENT: [ ] negative [ ] dry eyes [ ] eye irritation [ ] postnasal drip [ ] nasal congestion  CV: [ ] negative  [ ] chest pain [ ] orthopnea [ ] palpitations [ ] murmur  Resp: [ ] negative [ ] cough [ ] shortness of breath [ ] dyspnea [ ] wheezing [ ] sputum [ ] hemoptysis  GI: [ ] negative [ ] nausea [ ] vomiting [ ] diarrhea [ ] constipation [ ] abd pain [ ] dysphagia   : [ ] negative [ ] dysuria [ ] nocturia [ ] hematuria [ ] increased urinary frequency  Musculoskeletal: [ ] negative [ ] back pain [ ] myalgias [ ] arthralgias [ ] fracture  Skin: [ ] negative [ ] rash [ ] itch  Neurological: [ ] negative [ ] headache [ ] dizziness [ ] syncope [ ] weakness [ ] numbness  Psychiatric: [ ] negative [ ] anxiety [ ] depression  Endocrine: [ ] negative [ ] diabetes [ ] thyroid problem  Hematologic/Lymphatic: [ ] negative [ ] anemia [ ] bleeding problem  Allergic/Immunologic: [ ] negative [ ] itchy eyes [ ] nasal discharge [ ] hives [ ] angioedema  [ ] All other systems negative  [ ] Unable to assess ROS because ________    OBJECTIVE:  ICU Vital Signs Last 24 Hrs  T(C): 37.8 (01 Apr 2020 09:31), Max: 38.9 (31 Mar 2020 15:22)  T(F): 100.1 (01 Apr 2020 09:31), Max: 102 (31 Mar 2020 15:22)  HR: 113 (01 Apr 2020 09:31) (107 - 133)  BP: 100/57 (01 Apr 2020 09:31) (60/50 - 180/90)  BP(mean): 110 (31 Mar 2020 13:30) (45 - 110)  ABP: 109/66 (01 Apr 2020 04:00) (90/50 - 124/57)  ABP(mean): 82 (01 Apr 2020 04:00) (62 - 90)  RR: 30 (01 Apr 2020 09:31) (14 - 35)  SpO2: 94% (01 Apr 2020 09:31) (70% - 95%)    Mode: AC/ CMV (Assist Control/ Continuous Mandatory Ventilation), RR (machine): 30, TV (machine): 420, FiO2: 100, PEEP: 20, ITime: 1, MAP: 28, PIP: 46    03-31 @ 07:01  -  04-01 @ 07:00  --------------------------------------------------------  IN: 2353.2 mL / OUT: 560 mL / NET: 1793.2 mL      CAPILLARY BLOOD GLUCOSE      POCT Blood Glucose.: 179 mg/dL (01 Apr 2020 05:05)      PHYSICAL EXAM:  General: NAD  HEENT: atraumatic, normocephalic;  Neck: supple, no LAD, no thyromegaly  Respiratory: decreased air entry bilaterally   Cardiovascular: RRR, S1, S2, no M/R/G  Abdomen: normal BS; soft, non-distended, non-tender; no R/G  Extremities: radial and DP pulses intact b/l; no clubbing, cyanosis;  LINES: R femoral central line and left art arterial line     HOSPITAL MEDICATIONS:  Standing Meds:  azithromycin   Tablet 500 milliGRAM(s) Oral daily  chlorhexidine 0.12% Liquid 15 milliLiter(s) Oral Mucosa every 12 hours  cisatracurium Infusion 3 MICROgram(s)/kG/Min IV Continuous <Continuous>  cisatracurium Injectable 10 milliGRAM(s) IV Push once  dextrose 5% 1000 milliLiter(s) IV Continuous <Continuous>  etomidate Injectable 20 milliGRAM(s) IV Push once  heparin  Injectable 5000 Unit(s) SubCutaneous every 8 hours  HYDROmorphone  Injectable 1 milliGRAM(s) IV Push once  HYDROmorphone Infusion 1 mG/Hr IV Continuous <Continuous>  hydroxychloroquine 200 milliGRAM(s) Oral every 12 hours  hydroxychloroquine   Oral   midazolam Injectable 2 milliGRAM(s) IV Push once  norepinephrine Infusion 0.05 MICROgram(s)/kG/Min IV Continuous <Continuous>  pantoprazole    Tablet 40 milliGRAM(s) Oral before breakfast  phenylephrine    Infusion 0.5 MICROgram(s)/kG/Min IV Continuous <Continuous>  propofol Infusion 5 MICROgram(s)/kG/Min IV Continuous <Continuous>  succinylcholine Injectable 100 milliGRAM(s) IV Push once  vasopressin Infusion 0.04 Unit(s)/Min IV Continuous <Continuous>      PRN Meds:  acetaminophen    Suspension .. 650 milliGRAM(s) Oral every 6 hours PRN      LABS:                        13.0   34.96 )-----------( 263      ( 01 Apr 2020 09:28 )             42.1     Hgb Trend: 13.0<--, 12.3<--  04-01    140  |  100  |  24<H>  ----------------------------<  166<H>  4.7   |  31  |  5.42<H>    Ca    6.9<L>      01 Apr 2020 09:28  Phos  9.3     04-01  Mg     2.3     04-01    TPro  6.0  /  Alb  1.8<L>  /  TBili  1.4<H>  /  DBili  x   /  AST  203<H>  /  ALT  95<H>  /  AlkPhos  179<H>  04-01    Creatinine Trend: 5.42<--, 4.54<--, 1.04<--  PT/INR - ( 01 Apr 2020 06:11 )   PT: 11.8 sec;   INR: 1.04 ratio           ABG - ( 31 Mar 2020 18:18 )  pH, Arterial: 6.82  pH, Blood: x     /  pCO2: 173   /  pO2: 90    / HCO3: 27    / Base Excess: unreportable /  SaO2: unreportable             PF ratio:    Arterial Blood Gas:  03-31 @ 18:18  6.82/173/90/27/unreportable/unreportable  ABG lactate: --  Arterial Blood Gas:  03-31 @ 15:52  6.92/138/77/27/84/-11.9  ABG lactate: --      CRP:    Q3D labs:  ESR:  T-cell subset  D-Dimer  LDH  Feritin  Troponin   CK  PT/INR - ( 01 Apr 2020 06:11 )   PT: 11.8 sec;   INR: 1.04 ratio             MICROBIOLOGY:       RADIOLOGY:  [ ] Reviewed and interpreted by me    POCUS: CHIEF COMPLAINT: COVID-19    Interval Events: sedated intubated and paralyzed- prined overnight for better compliance       OBJECTIVE:  ICU Vital Signs Last 24 Hrs  T(C): 37.8 (01 Apr 2020 09:31), Max: 38.9 (31 Mar 2020 15:22)  T(F): 100.1 (01 Apr 2020 09:31), Max: 102 (31 Mar 2020 15:22)  HR: 113 (01 Apr 2020 09:31) (107 - 133)  BP: 100/57 (01 Apr 2020 09:31) (60/50 - 180/90)  BP(mean): 110 (31 Mar 2020 13:30) (45 - 110)  ABP: 109/66 (01 Apr 2020 04:00) (90/50 - 124/57)  ABP(mean): 82 (01 Apr 2020 04:00) (62 - 90)  RR: 30 (01 Apr 2020 09:31) (14 - 35)  SpO2: 94% (01 Apr 2020 09:31) (70% - 95%)    Mode: AC/ CMV (Assist Control/ Continuous Mandatory Ventilation), RR (machine): 30, TV (machine): 420, FiO2: 100, PEEP: 20, ITime: 1, MAP: 28, PIP: 46    03-31 @ 07:01  -  04-01 @ 07:00  --------------------------------------------------------  IN: 2353.2 mL / OUT: 560 mL / NET: 1793.2 mL      CAPILLARY BLOOD GLUCOSE      POCT Blood Glucose.: 179 mg/dL (01 Apr 2020 05:05)      PHYSICAL EXAM:  General: NAD  HEENT: atraumatic, normocephalic;  Neck: supple, no LAD, no thyromegaly  Respiratory: decreased air entry bilaterally   Cardiovascular: RRR, S1, S2, no M/R/G  Abdomen: normal BS; soft, non-distended, non-tender; no R/G  Extremities: radial and DP pulses intact b/l; no clubbing, cyanosis;  LINES: R femoral central line and left art arterial line     HOSPITAL MEDICATIONS:  Standing Meds:  azithromycin   Tablet 500 milliGRAM(s) Oral daily  chlorhexidine 0.12% Liquid 15 milliLiter(s) Oral Mucosa every 12 hours  cisatracurium Infusion 3 MICROgram(s)/kG/Min IV Continuous <Continuous>  cisatracurium Injectable 10 milliGRAM(s) IV Push once  dextrose 5% 1000 milliLiter(s) IV Continuous <Continuous>  etomidate Injectable 20 milliGRAM(s) IV Push once  heparin  Injectable 5000 Unit(s) SubCutaneous every 8 hours  HYDROmorphone  Injectable 1 milliGRAM(s) IV Push once  HYDROmorphone Infusion 1 mG/Hr IV Continuous <Continuous>  hydroxychloroquine 200 milliGRAM(s) Oral every 12 hours  hydroxychloroquine   Oral   midazolam Injectable 2 milliGRAM(s) IV Push once  norepinephrine Infusion 0.05 MICROgram(s)/kG/Min IV Continuous <Continuous>  pantoprazole    Tablet 40 milliGRAM(s) Oral before breakfast  phenylephrine    Infusion 0.5 MICROgram(s)/kG/Min IV Continuous <Continuous>  propofol Infusion 5 MICROgram(s)/kG/Min IV Continuous <Continuous>  succinylcholine Injectable 100 milliGRAM(s) IV Push once  vasopressin Infusion 0.04 Unit(s)/Min IV Continuous <Continuous>      PRN Meds:  acetaminophen    Suspension .. 650 milliGRAM(s) Oral every 6 hours PRN      LABS:                        13.0   34.96 )-----------( 263      ( 01 Apr 2020 09:28 )             42.1     Hgb Trend: 13.0<--, 12.3<--  04-01    140  |  100  |  24<H>  ----------------------------<  166<H>  4.7   |  31  |  5.42<H>    Ca    6.9<L>      01 Apr 2020 09:28  Phos  9.3     04-01  Mg     2.3     04-01    TPro  6.0  /  Alb  1.8<L>  /  TBili  1.4<H>  /  DBili  x   /  AST  203<H>  /  ALT  95<H>  /  AlkPhos  179<H>  04-01    Creatinine Trend: 5.42<--, 4.54<--, 1.04<--  PT/INR - ( 01 Apr 2020 06:11 )   PT: 11.8 sec;   INR: 1.04 ratio           ABG - ( 31 Mar 2020 18:18 )  pH, Arterial: 6.82  pH, Blood: x     /  pCO2: 173   /  pO2: 90    / HCO3: 27    / Base Excess: unreportable /  SaO2: unreportable             PF ratio:    Arterial Blood Gas:  03-31 @ 18:18  6.82/173/90/27/unreportable/unreportable  ABG lactate: --  Arterial Blood Gas:  03-31 @ 15:52  6.92/138/77/27/84/-11.9  ABG lactate: --      CRP:    Q3D labs:  ESR:  T-cell subset  D-Dimer  LDH  Feritin  Troponin   CK  PT/INR - ( 01 Apr 2020 06:11 )   PT: 11.8 sec;   INR: 1.04 ratio             MICROBIOLOGY:       RADIOLOGY:  [ ] Reviewed and interpreted by me    POCUS:

## 2020-04-01 NOTE — CONSULT NOTE ADULT - ATTENDING COMMENTS
[facility] NEPHROLOGY  Jeramie Ayala M.D.  Amol Copeland D.O.  Sahnnon Leija M.D.  Emilia Rivera, MSN, ANP-C  [phone]    [address] [place] [address]

## 2020-04-02 PROCEDURE — 84484 ASSAY OF TROPONIN QUANT: CPT

## 2020-04-02 PROCEDURE — 94002 VENT MGMT INPAT INIT DAY: CPT

## 2020-04-02 PROCEDURE — 82955 ASSAY OF G6PD ENZYME: CPT

## 2020-04-02 PROCEDURE — 82553 CREATINE MB FRACTION: CPT

## 2020-04-02 PROCEDURE — 82962 GLUCOSE BLOOD TEST: CPT

## 2020-04-02 PROCEDURE — 82803 BLOOD GASES ANY COMBINATION: CPT

## 2020-04-02 PROCEDURE — 80053 COMPREHEN METABOLIC PANEL: CPT

## 2020-04-02 PROCEDURE — 85027 COMPLETE CBC AUTOMATED: CPT

## 2020-04-02 PROCEDURE — 99285 EMERGENCY DEPT VISIT HI MDM: CPT

## 2020-04-02 PROCEDURE — 83605 ASSAY OF LACTIC ACID: CPT

## 2020-04-02 PROCEDURE — 86360 T CELL ABSOLUTE COUNT/RATIO: CPT

## 2020-04-02 PROCEDURE — 86140 C-REACTIVE PROTEIN: CPT

## 2020-04-02 PROCEDURE — 82550 ASSAY OF CK (CPK): CPT

## 2020-04-02 PROCEDURE — 99053 MED SERV 10PM-8AM 24 HR FAC: CPT

## 2020-04-02 PROCEDURE — 85379 FIBRIN DEGRADATION QUANT: CPT

## 2020-04-02 PROCEDURE — 83520 IMMUNOASSAY QUANT NOS NONAB: CPT

## 2020-04-02 PROCEDURE — 87633 RESP VIRUS 12-25 TARGETS: CPT

## 2020-04-02 PROCEDURE — 87040 BLOOD CULTURE FOR BACTERIA: CPT

## 2020-04-02 PROCEDURE — 94003 VENT MGMT INPAT SUBQ DAY: CPT

## 2020-04-02 PROCEDURE — 31500 INSERT EMERGENCY AIRWAY: CPT

## 2020-04-02 PROCEDURE — 87635 SARS-COV-2 COVID-19 AMP PRB: CPT

## 2020-04-02 PROCEDURE — 96374 THER/PROPH/DIAG INJ IV PUSH: CPT

## 2020-04-02 PROCEDURE — 83735 ASSAY OF MAGNESIUM: CPT

## 2020-04-02 PROCEDURE — 93005 ELECTROCARDIOGRAM TRACING: CPT

## 2020-04-02 PROCEDURE — 82728 ASSAY OF FERRITIN: CPT

## 2020-04-02 PROCEDURE — 85610 PROTHROMBIN TIME: CPT

## 2020-04-02 PROCEDURE — 85652 RBC SED RATE AUTOMATED: CPT

## 2020-04-02 PROCEDURE — 71045 X-RAY EXAM CHEST 1 VIEW: CPT

## 2020-04-02 PROCEDURE — 83615 LACTATE (LD) (LDH) ENZYME: CPT

## 2020-04-02 PROCEDURE — 84145 PROCALCITONIN (PCT): CPT

## 2020-04-02 PROCEDURE — 87581 M.PNEUMON DNA AMP PROBE: CPT

## 2020-04-02 PROCEDURE — 36415 COLL VENOUS BLD VENIPUNCTURE: CPT

## 2020-04-02 PROCEDURE — 84100 ASSAY OF PHOSPHORUS: CPT

## 2020-04-02 PROCEDURE — 87631 RESP VIRUS 3-5 TARGETS: CPT

## 2020-04-02 PROCEDURE — 87798 DETECT AGENT NOS DNA AMP: CPT

## 2020-04-02 PROCEDURE — 87486 CHLMYD PNEUM DNA AMP PROBE: CPT

## 2020-04-03 LAB
CD3-/CD16+CD56+(%): 17 % — SIGNIFICANT CHANGE UP (ref 4–25)
CD3-CD16+CD56+(ABS): 104 CELLS/UL — SIGNIFICANT CHANGE UP (ref 70–760)
G6PD RBC-CCNC: 17.7 U/G HGB — SIGNIFICANT CHANGE UP (ref 7–20.5)
LYMPHOCYTES, ABSOLUTE: 605 CELLS/UL — LOW (ref 850–3900)

## 2020-04-06 LAB
CULTURE RESULTS: SIGNIFICANT CHANGE UP
SPECIMEN SOURCE: SIGNIFICANT CHANGE UP

## 2020-04-09 LAB
A-TUMOR NECROSIS FACT SERPL-MCNC: <5 PG/ML — SIGNIFICANT CHANGE UP
IL10 SERPL-MCNC: 77 PG/ML — HIGH
IL12 SERPL-MCNC: <5 PG/ML — SIGNIFICANT CHANGE UP
IL13 SERPL-MCNC: SIGNIFICANT CHANGE UP PG/ML
IL2 SERPL-MCNC: 9440 PG/ML — HIGH
IL2 SERPL-MCNC: <5 PG/ML — SIGNIFICANT CHANGE UP
IL4 SERPL-MCNC: <5 PG/ML — SIGNIFICANT CHANGE UP
IL6 SERPL-MCNC: SIGNIFICANT CHANGE UP PG/ML
IL8 SERPL-MCNC: 8 PG/ML — HIGH
INTERFERON GAMMA: <5 PG/ML — SIGNIFICANT CHANGE UP
INTERLEUKIN 1 BETA: <5 PG/ML — SIGNIFICANT CHANGE UP
INTERLEUKIN 17: <5 PG/ML — SIGNIFICANT CHANGE UP
INTERLEUKIN 5: <5 PG/ML — SIGNIFICANT CHANGE UP

## 2023-11-29 NOTE — ED ADULT TRIAGE NOTE - NSWEIGHTCALCTOOLDRUG_GEN_A_CORE
Problem: At Risk for Falls  Goal: # Patient does not fall  Outcome: Outcome Met, Continue evaluating goal progress toward completion     Problem: Pain  Goal: # Acceptable pain level achieved/maintained with activity using NRS/Faces  Description: This goal is used for patients who can self-report and are not achieving acceptable pain control during activity.   Outcome: Outcome Met, Continue evaluating goal progress toward completion  used